# Patient Record
Sex: MALE | Race: WHITE | NOT HISPANIC OR LATINO | Employment: PART TIME | URBAN - METROPOLITAN AREA
[De-identification: names, ages, dates, MRNs, and addresses within clinical notes are randomized per-mention and may not be internally consistent; named-entity substitution may affect disease eponyms.]

---

## 2017-03-10 ENCOUNTER — TELEPHONE (OUTPATIENT)
Dept: GASTROENTEROLOGY | Facility: CLINIC | Age: 67
End: 2017-03-10

## 2017-03-10 NOTE — TELEPHONE ENCOUNTER
----- Message from Will Garcia sent at 3/10/2017  9:32 AM CST -----  Contact: PT [phone]  States he is calling to reschd his appt to another Friday around 10 am and can be reached at [phone]//[email or web address]

## 2017-04-06 ENCOUNTER — TELEPHONE (OUTPATIENT)
Dept: GASTROENTEROLOGY | Facility: CLINIC | Age: 67
End: 2017-04-06

## 2017-04-07 ENCOUNTER — HOSPITAL ENCOUNTER (OUTPATIENT)
Dept: RADIOLOGY | Facility: HOSPITAL | Age: 67
Discharge: HOME | End: 2017-04-07
Attending: INTERNAL MEDICINE
Payer: MEDICARE

## 2017-04-07 ENCOUNTER — OFFICE VISIT (OUTPATIENT)
Dept: GASTROENTEROLOGY | Facility: CLINIC | Age: 67
End: 2017-04-07
Payer: MEDICARE

## 2017-04-07 VITALS
HEART RATE: 80 BPM | SYSTOLIC BLOOD PRESSURE: 154 MMHG | HEIGHT: 74 IN | DIASTOLIC BLOOD PRESSURE: 76 MMHG | BODY MASS INDEX: 28.29 KG/M2 | WEIGHT: 220.44 LBS

## 2017-04-07 DIAGNOSIS — K74.60 LIVER CIRRHOSIS SECONDARY TO NASH: Primary | ICD-10-CM

## 2017-04-07 DIAGNOSIS — K75.81 LIVER CIRRHOSIS SECONDARY TO NASH: Primary | ICD-10-CM

## 2017-04-07 DIAGNOSIS — I85.10 SECONDARY ESOPHAGEAL VARICES WITHOUT BLEEDING: ICD-10-CM

## 2017-04-07 DIAGNOSIS — K74.60 LIVER CIRRHOSIS SECONDARY TO NASH: ICD-10-CM

## 2017-04-07 DIAGNOSIS — K75.81 LIVER CIRRHOSIS SECONDARY TO NASH: ICD-10-CM

## 2017-04-07 PROCEDURE — 99999 PR PBB SHADOW E&M-EST. PATIENT-LVL III: CPT | Mod: PBBFAC,,, | Performed by: INTERNAL MEDICINE

## 2017-04-07 PROCEDURE — 99204 OFFICE O/P NEW MOD 45 MIN: CPT | Mod: S$PBB,,, | Performed by: INTERNAL MEDICINE

## 2017-04-07 PROCEDURE — 76705 ECHO EXAM OF ABDOMEN: CPT | Mod: 26,,, | Performed by: RADIOLOGY

## 2017-04-07 PROCEDURE — 76705 ECHO EXAM OF ABDOMEN: CPT | Mod: TC,PO

## 2017-04-07 RX ORDER — MULTIVITAMIN
1 TABLET ORAL DAILY
COMMUNITY

## 2017-04-07 RX ORDER — AMOXICILLIN 500 MG
2 CAPSULE ORAL DAILY
COMMUNITY

## 2017-04-07 RX ORDER — LOSARTAN POTASSIUM AND HYDROCHLOROTHIAZIDE 12.5; 1 MG/1; MG/1
1 TABLET ORAL DAILY
COMMUNITY
Start: 2017-01-21

## 2017-04-07 NOTE — MR AVS SNAPSHOT
[facility]  [address] [facility] [address] [place] [address]  Phone: [phone]  Fax: [phone]                  Phi Segovia Jr.   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    -  Primary     Secondary esophageal varices without bleeding                To Do List           Future Appointments        Provider Department Dept Phone    2017 11:00 AM LAB, SAME DAY [facility] [phone]    2017 1:00 PM [facility] [phone]    10/6/2017 8:25 AM LABORATORY, [facility] [phone]    10/6/2017 8:30 AM [facility] [phone]    10/6/2017 11:30 AM Analy Fair MD [facility] [phone]      Goals (5 Years of Data)     None      Follow-Up and Disposition     Return in about 6 months (around 10/7/2017).      [facility]     [facility] Care [facility] -  Assistance  Unless otherwise directed by your provider, please contact [facility], our nurse care line that is available for  assistance.     Registered nurses in the [facility] provide: appointment scheduling, clinical advisement, health education, and other advisory services.  Call: [phone] (toll free)               Medications           Message regarding Medications     Verify the changes and/or additions to your medication regime listed below are the same as discussed with your clinician today.  If any of these changes or additions are incorrect, please notify your healthcare provider.             Verify that the below list of medications is an accurate representation of the medications you are currently taking.  If none reported, the list may be blank. If  "incorrect, please contact your healthcare provider. Carry this list with you in case of emergency.           Current Medications     fish oil-omega-3 fatty acids 300-1,000 mg capsule Take 2 g by mouth once daily.    losartan-hydrochlorothiazide 100-12.5 mg (HYZAAR) 100-12.5 mg Tab Take 1 tablet by mouth once daily.     multivitamin (ONE DAILY MULTIVITAMIN) per tablet Take 1 tablet by mouth once daily.           Clinical Reference Information           Your Vitals Were     BP Pulse Height Weight BMI    154/76 80 6' 2" (1.88 m) 100 kg (220 lb 7.4 oz) 28.31 kg/m2      Blood Pressure          Most Recent Value    BP  (!)  154/76      Allergies as of 4/7/2017     Sulfa (Sulfonamide Antibiotics)      Immunizations Administered on Date of Encounter - 4/7/2017     None      Orders Placed During Today's Visit     Future Labs/Procedures Expected by Expires    Hepatitis A antibody, IgG  4/7/2017 6/6/2018    Hepatitis B surface antibody  4/7/2017 6/6/2018    Recurring Lab Work Interval Expires    AFP tumor marker   4/7/2018    CBC auto differential   4/7/2018    Comprehensive metabolic panel   4/7/2018    Protime-INR   4/7/2018    US Abdomen Limited   4/7/2018      Language Assistance Services     ATTENTION: Language assistance services are available, free of charge. Please call [phone].      ATENCIÓN: Si trevala brenna, tiene a mchugh disposición servicios gratuitos de asistencia lingüística. Llame al [phone].     [facility] Ý: N?u b?n nói Ti?ng Vi?t, có các d?ch v? h? tr? ngôn ng? mi?n phí dành cho b?n. G?i s? [phone].         [facility] complies with applicable Federal civil rights laws and does not discriminate on the basis of race, color, national origin, age, disability, or sex.        "

## 2017-04-07 NOTE — PROGRESS NOTES
Subjective:     Phi Segovia Jr. is here for evaluation of Fatty Liver (referred by Dr. Olivo, accompanied by wife, Comfort)      HPI  Phi Segovia Jr. is here for evaluation of possible Gleason cirrhosis.  His outside records were reviewed.  His viral hepatitis studies were negative.  Normal alpha-1 antitrypsin phenotype, normal ceruloplasmin, normal LIDA, slightly elevated anti-smooth muscle antibody.  CMP shows mild elevations of AST and ALT, bilirubin and INR are normal.  CBC shows thrombocytopenia.  US from 2015 showed evidence of splenomegaly.    Past ultrasound was from October 2016 showed fatty infiltration of the liver with hepatomegaly.    The patient reports feeling well.  He denies previous diagnosis of liver disease and no family history of liver disease.  He denies any significant alcohol history.    No evidence of liver decompensation: no ascites, confusion or GI bleeding.      Review of Systems   Constitutional: Negative.    HENT: Negative.    Eyes: Negative.    Respiratory: Negative.    Cardiovascular: Negative.    Gastrointestinal: Negative.    Genitourinary: Negative.    Musculoskeletal: Negative.    Skin: Negative.    Neurological: Negative.    Psychiatric/Behavioral: Negative.        Objective:     Physical Exam   Constitutional: He is oriented to person, place, and time. He appears well-developed and well-nourished. No distress.   HENT:   Head: Normocephalic and atraumatic.   Mouth/Throat: Oropharynx is clear and moist. No oropharyngeal exudate.   Eyes: Conjunctivae are normal. Pupils are equal, round, and reactive to light. Right eye exhibits no discharge. Left eye exhibits no discharge. No scleral icterus.   Pulmonary/Chest: Effort normal and breath sounds normal. No respiratory distress. He has no wheezes.   Abdominal: Soft. He exhibits no distension. There is no tenderness.   Musculoskeletal: He exhibits no edema.   Neurological: He is alert and oriented to person, place, and  time.   Psychiatric: He has a normal mood and affect. His behavior is normal.   Vitals reviewed.      Computed MELD-Na score unavailable. Necessary lab results were not found.  Computed MELD score unavailable. Necessary lab results were not found.    No results found for: WBC, HGB, HCT, PLT  No results found for: BUN, CREATININE, GLU, CALCIUM, NA, K, CL, MG  No results found for: AST, ALT, ALKPHOS, BILITOT, ALBUMIN  No results found for: INR      Assessment/Plan:     1. Liver cirrhosis secondary to GLEASON    2. Secondary esophageal varices without bleeding      Phi Segovia Jr. is a 66 y.o. male withFatty Liver (referred by Dr. 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  -Extensive discussion with patient and wife about diagnosis of cirrhosis, current compensation and potential risk for decompensation as well as risk of liver cancer  -Will check meld labs today  -Will try to get ultrasound for HCC surveillance  -Discussed the importance of weight loss, ideal weight closer to 200 pounds  -Discussed importance of lipid and glycemic control  -     Hepatitis A antibody, IgG; Future; Expected date: 4/7/17  -     Comprehensive metabolic panel; Standing  -     CBC auto differential; Standing  -     AFP tumor marker; Standing  -     Protime-INR; Standing  -     US Abdomen Limited; Standing  -     Hepatitis B surface antibody; Future; Expected date: 4/7/17    Secondary esophageal varices without bleeding-no previous bleeding  -Continue with variceal surveillance as planned with repeat upper endoscopy in 2 years    Return to clinic in 6 months with pre-clinic labs and imaging      Analy Fair MD

## 2017-04-12 DIAGNOSIS — Z23 NEED FOR HEPATITIS A AND B VACCINATION: Primary | ICD-10-CM

## 2017-04-13 ENCOUNTER — TELEPHONE (OUTPATIENT)
Dept: GASTROENTEROLOGY | Facility: CLINIC | Age: 67
End: 2017-04-13

## 2017-04-13 NOTE — TELEPHONE ENCOUNTER
----- Message from Lisa Trinidad sent at 4/13/2017  1:46 PM CDT -----  Would like to speak to nurse. Please call back at [phone]. thanks

## 2017-04-13 NOTE — TELEPHONE ENCOUNTER
Spoke with patient. He states receiving  1st twinrix vaccination at [facility] and they will fax the confirmation. Will inform Dr. Fair, he verbalized understanding.

## 2017-04-17 ENCOUNTER — TELEPHONE (OUTPATIENT)
Dept: GASTROENTEROLOGY | Facility: CLINIC | Age: 67
End: 2017-04-17

## 2017-04-17 NOTE — TELEPHONE ENCOUNTER
Spoke with Jose David. He states receiving the consult note but no lab results. Will fax labs now.

## 2017-04-17 NOTE — TELEPHONE ENCOUNTER
----- Message from Kala Flowers sent at 4/17/2017 12:05 PM CDT -----  Contact: Jose David Segovia Office  Jose David Segovia Office would like call....[phone]

## 2017-06-28 ENCOUNTER — TELEPHONE (OUTPATIENT)
Dept: GASTROENTEROLOGY | Facility: CLINIC | Age: 67
End: 2017-06-28

## 2017-06-28 NOTE — TELEPHONE ENCOUNTER
----- Message from Ruth Hills sent at 6/28/2017  3:16 PM CDT -----  Contact: self [phone]  States that he is calling to get information on hep A & B combo. Please call back at [phone]//thank you acc

## 2017-06-30 ENCOUNTER — TELEPHONE (OUTPATIENT)
Dept: GASTROENTEROLOGY | Facility: CLINIC | Age: 67
End: 2017-06-30

## 2017-06-30 NOTE — TELEPHONE ENCOUNTER
----- Message from Haley Larios sent at 6/30/2017  1:32 PM CDT -----  Contact: pt  States he needs to speak to Trisha regarding the Hep A & B shot. Please call pt at [phone]. Thank you

## 2017-06-30 NOTE — TELEPHONE ENCOUNTER
Spoke with patient. Informed the need for the Twinrix vaccination, he verbalized understanding and will get externally, MARQUITA.

## 2017-09-01 ENCOUNTER — TELEPHONE (OUTPATIENT)
Dept: GASTROENTEROLOGY | Facility: CLINIC | Age: 67
End: 2017-09-01

## 2017-09-01 NOTE — TELEPHONE ENCOUNTER
----- Message from Karin Lnio sent at 9/1/2017  9:14 AM CDT -----  Contact: pt  Pt states [facility], if any question call pt @ [phone].

## 2017-10-05 ENCOUNTER — TELEPHONE (OUTPATIENT)
Dept: RADIOLOGY | Facility: HOSPITAL | Age: 67
End: 2017-10-05

## 2017-10-06 ENCOUNTER — HOSPITAL ENCOUNTER (OUTPATIENT)
Dept: RADIOLOGY | Facility: HOSPITAL | Age: 67
Discharge: HOME | End: 2017-10-06
Attending: INTERNAL MEDICINE
Payer: MEDICARE

## 2017-10-06 ENCOUNTER — OFFICE VISIT (OUTPATIENT)
Dept: GASTROENTEROLOGY | Facility: CLINIC | Age: 67
End: 2017-10-06
Payer: MEDICARE

## 2017-10-06 VITALS
SYSTOLIC BLOOD PRESSURE: 138 MMHG | DIASTOLIC BLOOD PRESSURE: 70 MMHG | HEART RATE: 80 BPM | WEIGHT: 224.88 LBS | BODY MASS INDEX: 28.86 KG/M2 | HEIGHT: 74 IN

## 2017-10-06 DIAGNOSIS — K75.81 LIVER CIRRHOSIS SECONDARY TO NASH: ICD-10-CM

## 2017-10-06 DIAGNOSIS — K74.60 LIVER CIRRHOSIS SECONDARY TO NASH: ICD-10-CM

## 2017-10-06 DIAGNOSIS — K74.69 OTHER CIRRHOSIS OF LIVER: Primary | ICD-10-CM

## 2017-10-06 PROCEDURE — 76705 ECHO EXAM OF ABDOMEN: CPT | Mod: 26,,, | Performed by: RADIOLOGY

## 2017-10-06 PROCEDURE — 76705 ECHO EXAM OF ABDOMEN: CPT | Mod: TC,PO

## 2017-10-06 PROCEDURE — 99213 OFFICE O/P EST LOW 20 MIN: CPT | Mod: S$PBB,,, | Performed by: INTERNAL MEDICINE

## 2017-10-06 PROCEDURE — 99999 PR PBB SHADOW E&M-EST. PATIENT-LVL III: CPT | Mod: PBBFAC,,, | Performed by: INTERNAL MEDICINE

## 2017-10-06 PROCEDURE — 99213 OFFICE O/P EST LOW 20 MIN: CPT | Mod: PBBFAC,25,PO | Performed by: INTERNAL MEDICINE

## 2017-10-06 NOTE — PROGRESS NOTES
Subjective:     Phi Segovia Jr. is here for follow up of Cirrhosis (took 2nd dose of twinrix 08/29/2017, taking last injection next week with [facility])      HPI  Since Phi Segovia Jr.'s last visit he denies any acute issues.  Overall he has been feeling well.    No evidence of liver decompensation: no ascites, confusion or GI bleeding.      Review of Systems    Objective:     Physical Exam   Constitutional: He is oriented to person, place, and time. He appears well-developed and well-nourished. No distress.   HENT:   Head: Normocephalic and atraumatic.   Mouth/Throat: Oropharynx is clear and moist. No oropharyngeal exudate.   Eyes: Conjunctivae are normal. Pupils are equal, round, and reactive to light. Right eye exhibits no discharge. Left eye exhibits no discharge. No scleral icterus.   Pulmonary/Chest: Effort normal and breath sounds normal. No respiratory distress. He has no wheezes.   Abdominal: Soft. He exhibits no distension. There is no tenderness.   Musculoskeletal: He exhibits no edema.   Neurological: He is alert and oriented to person, place, and time.   Psychiatric: He has a normal mood and affect. His behavior is normal.   Vitals reviewed.      US 10/2017  1.  Hepatomegaly with what appears to chronic-appearing liver disease.  No focal liver lesions.    2.  Nonobstructing lower pole right renal calculus    MELD-Na score: 7 at 10/6/2017  8:06 AM  MELD score: 7 at 10/6/2017  8:06 AM  Calculated from:  Serum Creatinine: 0.9 mg/dL (Rounded to 1) at 10/6/2017  8:06 AM  Serum Sodium: 141 mmol/L (Rounded to 137) at 10/6/2017  8:06 AM  Total Bilirubin: 0.7 mg/dL (Rounded to 1) at 10/6/2017  8:06 AM  INR(ratio): 1.1 at 10/6/2017  8:06 AM  Age: 67 years    WBC   Date Value Ref Range Status   10/06/2017 5.91 3.90 - 12.70 K/uL Final     Hemoglobin   Date Value Ref Range Status   10/06/2017 16.4 14.0 - 18.0 g/dL Final     Hematocrit   Date Value Ref Range Status   10/06/2017 45.1 40.0 - 54.0 % Final      Platelets   Date Value Ref Range Status   10/06/2017 81 (L) 150 - 350 K/uL Final     BUN, Bld   Date Value Ref Range Status   10/06/2017 15 8 - 23 mg/dL Final     Creatinine   Date Value Ref Range Status   10/06/2017 0.9 0.5 - 1.4 mg/dL Final     Glucose   Date Value Ref Range Status   10/06/2017 128 (H) 70 - 110 mg/dL Final     Calcium   Date Value Ref Range Status   10/06/2017 9.9 8.7 - 10.5 mg/dL Final     Sodium   Date Value Ref Range Status   10/06/2017 141 136 - 145 mmol/L Final     Potassium   Date Value Ref Range Status   10/06/2017 3.9 3.5 - 5.1 mmol/L Final     Chloride   Date Value Ref Range Status   10/06/2017 106 95 - 110 mmol/L Final     AST   Date Value Ref Range Status   10/06/2017 59 (H) 10 - 40 U/L Final     ALT   Date Value Ref Range Status   10/06/2017 73 (H) 10 - 44 U/L Final     Alkaline Phosphatase   Date Value Ref Range Status   10/06/2017 110 55 - 135 U/L Final     Total Bilirubin   Date Value Ref Range Status   10/06/2017 0.7 0.1 - 1.0 mg/dL Final     Comment:     For infants and newborns, interpretation of results should be based  on gestational age, weight and in agreement with clinical  observations.  Premature Infant recommended reference ranges:  Up to 24 hours.............<8.0 mg/dL  Up to 48 hours............<12.0 mg/dL  3-5 days..................<15.0 mg/dL  6-29 days.................<15.0 mg/dL       Albumin   Date Value Ref Range Status   10/06/2017 4.1 3.5 - 5.2 g/dL Final     INR   Date Value Ref Range Status   10/06/2017 1.1 0.8 - 1.2 Final     Comment:     Coumadin Therapy:  2.0 - 3.0 for INR for all indicators except mechanical heart valves  and antiphospholipid syndromes which should use 2.5 - 3.5.           Assessment/Plan:     1. Other cirrhosis of liver      Phi Segovia Jr. is a 67 y.o. male withCirrhosis (took 2nd dose of twinrix 08/29/2017, taking last injection next week with [facility])      Other cirrhosis of liver-low meld, well compensated  -Continue with  meld labs every 6 months  -HCC surveillance every 6 months  -Continue to maintain strict glycemic, lipid and weight control.  -     Comprehensive metabolic panel; Future; Expected date: 10/06/2017  -     CBC auto differential; Future; Expected date: 10/06/2017  -     AFP tumor marker; Future; Expected date: 10/06/2017  -     Protime-INR; Future; Expected date: 10/06/2017  -     US Abdomen Limited; Future; Expected date: 10/06/2017  -     US Abdomen Limited; Future; Expected date: 10/06/2017  -     Comprehensive metabolic panel; Future; Expected date: 10/06/2017  -     CBC auto differential; Future; Expected date: 10/06/2017  -     AFP tumor marker; Future; Expected date: 10/06/2017  -     Protime-INR; Future; Expected date: 10/06/2017  -Needs to complete Hep A and B vaccines  -Repeat EGD in 2019    RTC in 6 months or pre-clinic labs and imaging    Analy Fair MD

## 2017-10-13 ENCOUNTER — TELEPHONE (OUTPATIENT)
Dept: GASTROENTEROLOGY | Facility: CLINIC | Age: 67
End: 2017-10-13

## 2017-10-13 NOTE — TELEPHONE ENCOUNTER
Spoke with patient. He states he wanted to try Gas X or simethicone for his bloating. He took his last Twinrix vaccination today along with Flu and pneumonia, will inform Dr. Fair, he verbalized understadning, MARQUITA.

## 2017-10-13 NOTE — TELEPHONE ENCOUNTER
----- Message from Kathy Zelaya sent at 10/13/2017  2:49 PM CDT -----  Contact: pt   Call pt regarding being bloated      ...[phone]

## 2018-03-15 ENCOUNTER — TELEPHONE (OUTPATIENT)
Dept: GASTROENTEROLOGY | Facility: CLINIC | Age: 68
End: 2018-03-15

## 2018-03-15 NOTE — TELEPHONE ENCOUNTER
----- Message from Liz Gore sent at 3/14/2018  1:59 PM CDT -----  Contact: self   Patient would like to know if blood work on 4/13/18. Please call back at [phone].      Thanks,  Liz Gore

## 2018-03-22 ENCOUNTER — TELEPHONE (OUTPATIENT)
Dept: GASTROENTEROLOGY | Facility: CLINIC | Age: 68
End: 2018-03-22

## 2018-03-22 NOTE — TELEPHONE ENCOUNTER
----- Message from Kathy Zelaya sent at 3/22/2018  9:50 AM CDT -----  Contact: pt  Call pt regarding his appt that need to be reschedule. Pt also states that he need to talk to the nurse to get a CBC lab.   [phone]

## 2018-04-12 ENCOUNTER — TELEPHONE (OUTPATIENT)
Dept: RADIOLOGY | Facility: HOSPITAL | Age: 68
End: 2018-04-12

## 2018-04-12 ENCOUNTER — TELEPHONE (OUTPATIENT)
Dept: GASTROENTEROLOGY | Facility: CLINIC | Age: 68
End: 2018-04-12

## 2018-04-12 ENCOUNTER — PATIENT MESSAGE (OUTPATIENT)
Dept: GASTROENTEROLOGY | Facility: CLINIC | Age: 68
End: 2018-04-12

## 2018-04-12 DIAGNOSIS — Z12.5 SPECIAL SCREENING FOR MALIGNANT NEOPLASM OF PROSTATE: ICD-10-CM

## 2018-04-12 DIAGNOSIS — E11.9 DIABETES MELLITUS WITHOUT COMPLICATION: ICD-10-CM

## 2018-04-12 DIAGNOSIS — E78.1 PURE HYPERGLYCERIDEMIA: Primary | ICD-10-CM

## 2018-04-12 NOTE — PROGRESS NOTES
Pt to see me tomorrow for Cirrhosis f/u.   Per request of PCP will add labs for tomorrow in addition to his regular cirrhosis management.

## 2018-04-12 NOTE — PROGRESS NOTES
Labs ordered for Dr. Zander Segovia (ph# [phone], fax# [phone]), [facility].

## 2018-04-12 NOTE — TELEPHONE ENCOUNTER
Left message on answering machine to return a call to Dr. Fair's office at [facility],reschedule appointment, [facility].

## 2018-04-13 ENCOUNTER — HOSPITAL ENCOUNTER (OUTPATIENT)
Dept: RADIOLOGY | Facility: HOSPITAL | Age: 68
Discharge: HOME | End: 2018-04-13
Attending: INTERNAL MEDICINE
Payer: MEDICARE

## 2018-04-13 ENCOUNTER — OFFICE VISIT (OUTPATIENT)
Dept: GASTROENTEROLOGY | Facility: CLINIC | Age: 68
End: 2018-04-13
Payer: MEDICARE

## 2018-04-13 VITALS
HEART RATE: 60 BPM | WEIGHT: 220.44 LBS | DIASTOLIC BLOOD PRESSURE: 70 MMHG | SYSTOLIC BLOOD PRESSURE: 128 MMHG | HEIGHT: 74 IN | BODY MASS INDEX: 28.29 KG/M2

## 2018-04-13 DIAGNOSIS — K75.81 LIVER CIRRHOSIS SECONDARY TO NASH: Primary | ICD-10-CM

## 2018-04-13 DIAGNOSIS — K74.60 LIVER CIRRHOSIS SECONDARY TO NASH: Primary | ICD-10-CM

## 2018-04-13 DIAGNOSIS — I85.10 SECONDARY ESOPHAGEAL VARICES WITHOUT BLEEDING: ICD-10-CM

## 2018-04-13 DIAGNOSIS — K74.69 OTHER CIRRHOSIS OF LIVER: ICD-10-CM

## 2018-04-13 PROCEDURE — 99999 PR PBB SHADOW E&M-EST. PATIENT-LVL III: CPT | Mod: PBBFAC,,, | Performed by: NURSE PRACTITIONER

## 2018-04-13 PROCEDURE — 76705 ECHO EXAM OF ABDOMEN: CPT | Mod: TC,PO

## 2018-04-13 PROCEDURE — 76705 ECHO EXAM OF ABDOMEN: CPT | Mod: 26,,, | Performed by: RADIOLOGY

## 2018-04-13 PROCEDURE — 99214 OFFICE O/P EST MOD 30 MIN: CPT | Mod: S$PBB,,, | Performed by: NURSE PRACTITIONER

## 2018-04-13 PROCEDURE — 99213 OFFICE O/P EST LOW 20 MIN: CPT | Mod: PBBFAC,25,PO | Performed by: NURSE PRACTITIONER

## 2018-04-13 RX ORDER — METFORMIN HYDROCHLORIDE 500 MG/1
500 TABLET ORAL 2 TIMES DAILY
COMMUNITY

## 2018-04-13 RX ORDER — DEXTROSE 4 G
TABLET,CHEWABLE ORAL
Refills: 0 | COMMUNITY
Start: 2018-02-16

## 2018-04-13 NOTE — PROGRESS NOTES
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 A diagnosis of Secondary esophageal varices without bleeding was also pertinent to this visit.    PCP: Zander Segovia       HPI:  This is a 67 y.o. male here for follow up of the above  Pt states that since his last visit, he has been feeling overall well without any complaints.   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   He reports that his wife  in February after battling cancer.    Recent labs and imaging are stable.     Review of Systems   Constitutional: Negative for chills, fever, malaise/fatigue and weight loss.   Respiratory: Negative for cough.    Cardiovascular: Negative for chest pain.   Gastrointestinal:        Per HPI   Musculoskeletal: Negative for myalgias.   Skin: Negative for itching and rash.   Neurological: Negative for headaches.   Psychiatric/Behavioral: The patient is not nervous/anxious.        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 No pertinent surgical history.    Family History:   History reviewed. No pertinent family history.    Social History:   Social History   Substance Use Topics    Smoking status: Never Smoker    Smokeless tobacco: Never Used    Alcohol use Yes      Comment: socially       Allergies: Reviewed    Home Medications:  Current Outpatient Prescriptions on File Prior to Visit   Medication Sig Dispense Refill    fish oil-omega-3 fatty acids 300-1,000 mg capsule Take 2 g by mouth once daily.      losartan-hydrochlorothiazide 100-12.5 mg (HYZAAR) 100-12.5 mg Tab Take 1 tablet by mouth once daily.       multivitamin (ONE DAILY MULTIVITAMIN) per tablet Take 1 tablet by mouth once daily.       No current facility-administered medications on  "file prior to visit.        Physical Exam:  Vital Signs:  /70   Pulse 60   Ht 6' 2" (1.88 m)   Wt 100 kg (220 lb 7.4 oz)   BMI 28.31 kg/m²   Body mass index is 28.31 kg/m².  Physical Exam   Constitutional: He is oriented to person, place, and time. He appears well-developed.   HENT:   Head: Normocephalic.   Eyes: No scleral icterus.   Neck: Normal range of motion.   Cardiovascular: Normal rate and regular rhythm.    Pulmonary/Chest: Effort normal and breath sounds normal.   Abdominal: Soft. Bowel sounds are normal. He exhibits no distension. There is no tenderness.   Musculoskeletal: Normal range of motion.   Neurological: He is alert and oriented to person, place, and time.   Skin: Skin is warm and dry.   Psychiatric: He has a normal mood and affect.   Vitals reviewed.      Labs: Pertinent labs reviewed.  MELD-Na score: 10 at 4/13/2018  7:41 AM  MELD score: 10 at 4/13/2018  7:41 AM  Calculated from:  Serum Creatinine: 0.9 mg/dL (Rounded to 1) at 4/13/2018  7:41 AM  Serum Sodium: 141 mmol/L (Rounded to 137) at 4/13/2018  7:41 AM  Total Bilirubin: 1.6 mg/dL at 4/13/2018  7:41 AM  INR(ratio): 1.2 at 4/13/2018  7:41 AM  Age: 67 years    Assessment:  1. 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  Will have him repeat in 4 weeks for trending.   - Otherwise, will continue MELD and HCC surveillance every 6 months.   Repeat EGD in 2019    -     CBC auto differential; Future; Expected date: 04/13/2018  -     Comprehensive metabolic panel; Future; Expected date: 04/13/2018  -     Protime-INR; Future; Expected date: 04/13/2018  -     AFP tumor marker; Future; Expected date: 04/13/2018  -     US Abdomen Limited; Future; Expected date: 04/13/2018  -     Hepatic function panel; Future; Expected date: 05/13/2018    Secondary esophageal varices " without bleeding          Return to Clinic:    Follow-up in about 6 months (around 10/13/2018).

## 2018-05-09 ENCOUNTER — TELEPHONE (OUTPATIENT)
Dept: GASTROENTEROLOGY | Facility: CLINIC | Age: 68
End: 2018-05-09

## 2018-05-09 DIAGNOSIS — Z23 NEED FOR PROPHYLACTIC VACCINATION AGAINST HEPATITIS A AND HEPATITIS B: Primary | ICD-10-CM

## 2018-05-09 NOTE — TELEPHONE ENCOUNTER
----- Message from Mirta Leach LPN sent at 4/19/2018  7:48 AM CDT -----  Regarding: Twinrix orders  Please enter 2 orders for Twinrix.       ----- Message -----  From: Mirta Leach LPN  Sent: 10/5/2017  11:35 AM  To: Mirta Leach LPN  Subject: Twinrix                                          Start Twinrix series.

## 2018-05-18 ENCOUNTER — PATIENT MESSAGE (OUTPATIENT)
Dept: GASTROENTEROLOGY | Facility: CLINIC | Age: 68
End: 2018-05-18

## 2018-09-27 ENCOUNTER — HISTORICAL (OUTPATIENT)
Dept: LAB | Facility: HOSPITAL | Age: 68
End: 2018-09-27

## 2018-09-27 LAB
ABS NEUT (OLG): 2.35
ALBUMIN SERPL-MCNC: 4 GM/DL (ref 3.4–5)
ALBUMIN/GLOB SERPL: 1.1 RATIO (ref 1.1–2)
ALP SERPL-CCNC: 99 UNIT/L (ref 46–116)
ALT SERPL-CCNC: 110 UNIT/L (ref 12–78)
APPEARANCE, UA: CLEAR
AST SERPL-CCNC: 86 UNIT/L (ref 10–37)
BACTERIA #/AREA URNS AUTO: NORMAL /HPF
BASOPHILS # BLD AUTO: 0.01 X10(3)/MCL
BASOPHILS NFR BLD AUTO: 0.2 %
BILIRUB SERPL-MCNC: 1.1 MG/DL (ref 0.2–1)
BILIRUB UR QL STRIP: NEGATIVE
BILIRUBIN DIRECT+TOT PNL SERPL-MCNC: 0.28 MG/DL (ref 0–0.2)
BILIRUBIN DIRECT+TOT PNL SERPL-MCNC: 0.82 MG/DL
BUN SERPL-MCNC: 16 MG/DL (ref 7–18)
CALCIUM SERPL-MCNC: 9.6 MG/DL (ref 8.5–10.1)
CHLORIDE SERPL-SCNC: 103 MMOL/L (ref 98–107)
CHOLEST SERPL-MCNC: 139 MG/DL (ref 50–200)
CHOLEST/HDLC SERPL: 4 {RATIO} (ref 0–5)
CO2 SERPL-SCNC: 26.3 MMOL/L (ref 21–32)
COLOR UR: YELLOW
CREAT SERPL-MCNC: 0.85 MG/DL (ref 0.7–1.3)
CREAT UR-MCNC: 124 MG/DL
EOSINOPHIL # BLD AUTO: 0.16 X10(3)/MCL
EOSINOPHIL NFR BLD AUTO: 3.4 %
ERYTHROCYTE [DISTWIDTH] IN BLOOD BY AUTOMATED COUNT: 14 %
EST. AVERAGE GLUCOSE BLD GHB EST-MCNC: 126 MG/DL
GLOBULIN SER-MCNC: 3.7 GM/DL (ref 2.4–3.5)
GLUCOSE (UA): NEGATIVE
GLUCOSE SERPL-MCNC: 121 MG/DL (ref 74–106)
HBA1C MFR BLD: 6 % (ref 4.5–6.2)
HCT VFR BLD AUTO: 42.3 % (ref 39–49)
HDLC SERPL-MCNC: 34 MG/DL (ref 35–60)
HGB BLD-MCNC: 15.2 GM/DL (ref 12.6–16.6)
HGB UR QL STRIP: NEGATIVE
IMM GRANULOCYTES # BLD AUTO: 0 10*3/UL (ref 0–0.1)
IMM GRANULOCYTES NFR BLD AUTO: 0 % (ref 0–1)
KETONES UR QL STRIP: NEGATIVE
LDLC SERPL CALC-MCNC: 72 MG/DL (ref 50–140)
LEUKOCYTE ESTERASE UR QL STRIP: NEGATIVE
LYMPHOCYTES # BLD AUTO: 1.68 X10(3)/MCL
LYMPHOCYTES NFR BLD AUTO: 35.7 %
MCH RBC QN AUTO: 31.4 PG (ref 27–33)
MCHC RBC AUTO-ENTMCNC: 35.9 GM/DL (ref 32–35)
MCV RBC AUTO: 87.4 FL (ref 84–97)
MICROALBUMIN UR-MCNC: 1.2 MG/DL
MICROALBUMIN/CREAT RATIO PNL UR: 9.7 MG/GM CR (ref 0–30)
MONOCYTES # BLD AUTO: 0.51 X10(3)/MCL
MONOCYTES NFR BLD AUTO: 10.8 %
NEUTROPHILS # BLD AUTO: 2.35 X10(3)/MCL
NEUTROPHILS NFR BLD AUTO: 49.9 %
NITRITE UR QL STRIP.AUTO: NEGATIVE
PH UR STRIP: 6 [PH] (ref 4.6–8)
PLATELET # BLD AUTO: 81 X10(3)/MCL (ref 151–368)
PLATELET # BLD EST: NORMAL 10*3/UL
PMV BLD AUTO: 10 FL
POTASSIUM SERPL-SCNC: 3.5 MMOL/L (ref 3.5–5.1)
PROT SERPL-MCNC: 7.7 GM/DL (ref 6.4–8.2)
PROT UR QL STRIP: NEGATIVE
PSA SERPL-MCNC: 1.86 NG/ML (ref 0–4)
RBC # BLD AUTO: 4.84 X10(6)/MCL (ref 4.3–5.6)
RBC #/AREA URNS HPF: NORMAL /HPF
SODIUM SERPL-SCNC: 140 MMOL/L (ref 136–145)
SP GR UR STRIP: 1.02 (ref 1–1.03)
SQUAMOUS EPITHELIAL, UA: NORMAL
TRIGL SERPL-MCNC: 167 MG/DL (ref 30–150)
UROBILINOGEN UR STRIP-ACNC: 0.2
VLDLC SERPL CALC-MCNC: 33 MG/DL
WBC # SPEC AUTO: 4.71 X10(3)/MCL (ref 3.4–9.2)
WBC #/AREA URNS AUTO: NORMAL /[HPF]

## 2018-10-11 ENCOUNTER — TELEPHONE (OUTPATIENT)
Dept: RADIOLOGY | Facility: HOSPITAL | Age: 68
End: 2018-10-11

## 2018-10-12 ENCOUNTER — CLINICAL SUPPORT (OUTPATIENT)
Dept: GASTROENTEROLOGY | Facility: CLINIC | Age: 68
End: 2018-10-12
Payer: MEDICARE

## 2018-10-12 ENCOUNTER — OFFICE VISIT (OUTPATIENT)
Dept: GASTROENTEROLOGY | Facility: CLINIC | Age: 68
End: 2018-10-12
Payer: MEDICARE

## 2018-10-12 ENCOUNTER — HOSPITAL ENCOUNTER (OUTPATIENT)
Dept: RADIOLOGY | Facility: HOSPITAL | Age: 68
Discharge: HOME | End: 2018-10-12
Attending: NURSE PRACTITIONER
Payer: MEDICARE

## 2018-10-12 VITALS
WEIGHT: 228.19 LBS | HEART RATE: 72 BPM | BODY MASS INDEX: 29.29 KG/M2 | HEIGHT: 74 IN | DIASTOLIC BLOOD PRESSURE: 86 MMHG | SYSTOLIC BLOOD PRESSURE: 160 MMHG

## 2018-10-12 DIAGNOSIS — K75.81 LIVER CIRRHOSIS SECONDARY TO NASH: Primary | ICD-10-CM

## 2018-10-12 DIAGNOSIS — K74.60 LIVER CIRRHOSIS SECONDARY TO NASH: Primary | ICD-10-CM

## 2018-10-12 DIAGNOSIS — Z23 NEED FOR PROPHYLACTIC VACCINATION AGAINST HEPATITIS A AND HEPATITIS B: ICD-10-CM

## 2018-10-12 DIAGNOSIS — K74.60 LIVER CIRRHOSIS SECONDARY TO NASH: ICD-10-CM

## 2018-10-12 DIAGNOSIS — K75.81 LIVER CIRRHOSIS SECONDARY TO NASH: ICD-10-CM

## 2018-10-12 DIAGNOSIS — I85.10 SECONDARY ESOPHAGEAL VARICES WITHOUT BLEEDING: ICD-10-CM

## 2018-10-12 PROCEDURE — 99999 PR PBB SHADOW E&M-EST. PATIENT-LVL III: CPT | Mod: PBBFAC,,, | Performed by: NURSE PRACTITIONER

## 2018-10-12 PROCEDURE — 99214 OFFICE O/P EST MOD 30 MIN: CPT | Mod: S$PBB,,, | Performed by: NURSE PRACTITIONER

## 2018-10-12 PROCEDURE — 76705 ECHO EXAM OF ABDOMEN: CPT | Mod: 26,,, | Performed by: RADIOLOGY

## 2018-10-12 PROCEDURE — 76705 ECHO EXAM OF ABDOMEN: CPT | Mod: TC,PO

## 2018-10-12 PROCEDURE — 99213 OFFICE O/P EST LOW 20 MIN: CPT | Mod: PBBFAC,25,PO | Performed by: NURSE PRACTITIONER

## 2018-10-12 RX ORDER — EZETIMIBE 10 MG/1
TABLET ORAL
Status: ON HOLD | COMMUNITY
Start: 2018-08-28 | End: 2019-01-21 | Stop reason: HOSPADM

## 2018-10-12 NOTE — PROGRESS NOTES
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 A diagnosis of Secondary esophageal varices without bleeding was also pertinent to this visit.    PCP: Zander Segovia       HPI:  This is a 68 y.o. male here for follow up of the above  Pt states that he has overall been feeling well without any complaints  Denies any abdominal pain.  No nausea or vomiting.  No change in bowel pattern.  No melena or hematochezia. No weight loss.  No upper GI bleeding.  No ascites or BLE.  No overt confusion.  Recent HCC imaging is stable without lesions.  Waiting for updated labs.    Review of Systems   Constitutional: Negative for chills, fever, malaise/fatigue and weight loss.   Respiratory: Negative for cough.    Cardiovascular: Negative for chest pain.   Gastrointestinal:        Per HPI   Musculoskeletal: Negative for myalgias.   Skin: Negative for itching and rash.   Neurological: Negative for headaches.   Psychiatric/Behavioral: The patient is not nervous/anxious.        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    Family History:   No family history on file.    Social History:   Social History     Tobacco Use    Smoking status: Never Smoker    Smokeless tobacco: Never Used   Substance Use Topics    Alcohol use: Yes     Comment: socially    Drug use: No       Allergies: Reviewed    Home Medications:  Current Outpatient Medications on File Prior to Visit   Medication Sig Dispense Refill    blood-glucose meter Misc AS DIRECTED  0    ezetimibe (ZETIA) 10 mg tablet       fish oil-omega-3 fatty acids 300-1,000 mg capsule Take 2 g by mouth once daily.      losartan-hydrochlorothiazide 100-12.5 mg (HYZAAR) 100-12.5 mg Tab Take 1 tablet by mouth once daily.     "   metFORMIN (GLUCOPHAGE) 500 MG tablet Take 500 mg by mouth 2 (two) times daily.       multivitamin (ONE DAILY MULTIVITAMIN) per tablet Take 1 tablet by mouth once daily.       No current facility-administered medications on file prior to visit.        Physical Exam:  Vital Signs:  BP (!) 160/86   Pulse 72   Ht 6' 2" (1.88 m)   Wt 103.5 kg (228 lb 2.8 oz)   BMI 29.30 kg/m²   Body mass index is 29.3 kg/m².  Physical Exam   Constitutional: He is oriented to person, place, and time. He appears well-developed.   HENT:   Head: Normocephalic.   Eyes: No scleral icterus.   Neck: Normal range of motion.   Cardiovascular: Normal rate and regular rhythm.   Pulmonary/Chest: Effort normal and breath sounds normal.   Abdominal: Soft. Bowel sounds are normal. He exhibits no distension. There is no tenderness.   Musculoskeletal: Normal range of motion.   Neurological: He is alert and oriented to person, place, and time.   Skin: Skin is warm and dry.   Psychiatric: He has a normal mood and affect.   Vitals reviewed.      Labs: Pertinent labs reviewed.  MELD-Na score: 10 at 4/13/2018  7:41 AM  MELD score: 10 at 4/13/2018  7:41 AM  Calculated from:  Serum Creatinine: 0.9 mg/dL (Rounded to 1 mg/dL) at 4/13/2018  7:41 AM  Serum Sodium: 141 mmol/L (Rounded to 137 mmol/L) at 4/13/2018  7:41 AM  Total Bilirubin: 1.6 mg/dL at 4/13/2018  7:41 AM  INR(ratio): 1.2 at 4/13/2018  7:41 AM  Age: 67 years  EGD: 2016, EV seen.  Records in Media.  Needs repeat EGD January 2019  HCC: US 10/12/18, No lesions or masses    Assessment:  1. 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 Secondary esophageal varices without bleeding        Recommendations:  - Stable  - Plan for labs and HCC surveillance every 6 months  - EGD in January for EV surveillance.   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  -     Case request GI: EGD (ESOPHAGOGASTRODUODENOSCOPY)  -     CBC auto differential; Future; Expected date: 10/12/2018  -     Comprehensive metabolic panel; Future; " Expected date: 10/12/2018  -     Protime-INR; Future; Expected date: 10/12/2018  -     AFP tumor marker; Future; Expected date: 10/12/2018  -     US Abdomen Limited; Future; Expected date: 10/12/2018    Secondary esophageal varices without bleeding  -     Case request GI: EGD (ESOPHAGOGASTRODUODENOSCOPY)  -     CBC auto differential; Future; Expected date: 10/12/2018  -     Comprehensive metabolic panel; Future; Expected date: 10/12/2018  -     Protime-INR; Future; Expected date: 10/12/2018  -     AFP tumor marker; Future; Expected date: 10/12/2018  -     US Abdomen Limited; Future; Expected date: 10/12/2018          Return to Clinic:    Follow-up in about 6 months (around 4/12/2019).

## 2018-12-20 ENCOUNTER — PATIENT MESSAGE (OUTPATIENT)
Dept: GASTROENTEROLOGY | Facility: CLINIC | Age: 68
End: 2018-12-20

## 2018-12-28 ENCOUNTER — HISTORICAL (OUTPATIENT)
Dept: ADMINISTRATIVE | Facility: HOSPITAL | Age: 68
End: 2018-12-28

## 2019-01-16 ENCOUNTER — TELEPHONE (OUTPATIENT)
Dept: GASTROENTEROLOGY | Facility: CLINIC | Age: 69
End: 2019-01-16

## 2019-01-16 NOTE — TELEPHONE ENCOUNTER
Addendum to last note: Pt called and asked about last MELD score and wanted to know if creatine and OTC supplements was okay to take. I let him know that [facility] does not recommend any OTC supplements because these supplements can cause elevated liver enzymes. Pt verbalized understanding. NOE.

## 2019-01-16 NOTE — TELEPHONE ENCOUNTER
Pt called and states that he is in pain and he needs fluid drawn from abdomen. I let him know that if he is in pain and needs fluids drawn immediately then he will have to go to the ER to be treated. Patient verbalized understanding. SUAD

## 2019-01-21 ENCOUNTER — ANESTHESIA (OUTPATIENT)
Dept: ENDOSCOPY | Facility: HOSPITAL | Age: 69
End: 2019-01-21
Payer: MEDICARE

## 2019-01-21 ENCOUNTER — HOSPITAL ENCOUNTER (OUTPATIENT)
Facility: HOSPITAL | Age: 69
Discharge: HOME | End: 2019-01-21
Attending: INTERNAL MEDICINE | Admitting: INTERNAL MEDICINE
Payer: MEDICARE

## 2019-01-21 ENCOUNTER — ANESTHESIA EVENT (OUTPATIENT)
Dept: ENDOSCOPY | Facility: HOSPITAL | Age: 69
End: 2019-01-21
Payer: MEDICARE

## 2019-01-21 DIAGNOSIS — I85.00 ESOPHAGEAL VARICES: ICD-10-CM

## 2019-01-21 DIAGNOSIS — I85.10 SECONDARY ESOPHAGEAL VARICES WITHOUT BLEEDING: Primary | ICD-10-CM

## 2019-01-21 LAB — POCT GLUCOSE: 115 MG/DL (ref 70–110)

## 2019-01-21 PROCEDURE — 43239 EGD BIOPSY SINGLE/MULTIPLE: CPT | Performed by: INTERNAL MEDICINE

## 2019-01-21 PROCEDURE — 27201012 HC FORCEPS, HOT/COLD, DISP: Performed by: INTERNAL MEDICINE

## 2019-01-21 PROCEDURE — 88305 TISSUE EXAM BY PATHOLOGIST: CPT | Mod: 26,,, | Performed by: PATHOLOGY

## 2019-01-21 PROCEDURE — 88342 TISSUE SPECIMEN TO PATHOLOGY - SURGERY: ICD-10-PCS | Mod: 26,,, | Performed by: PATHOLOGY

## 2019-01-21 PROCEDURE — 25000003 PHARM REV CODE 250: Performed by: NURSE ANESTHETIST, CERTIFIED REGISTERED

## 2019-01-21 PROCEDURE — 37000008 HC ANESTHESIA 1ST 15 MINUTES: Performed by: INTERNAL MEDICINE

## 2019-01-21 PROCEDURE — 43239 EGD BIOPSY SINGLE/MULTIPLE: CPT | Mod: ,,, | Performed by: INTERNAL MEDICINE

## 2019-01-21 PROCEDURE — 63600175 PHARM REV CODE 636 W HCPCS: Performed by: NURSE ANESTHETIST, CERTIFIED REGISTERED

## 2019-01-21 PROCEDURE — 25000003 PHARM REV CODE 250: Performed by: INTERNAL MEDICINE

## 2019-01-21 PROCEDURE — 43239 PR EGD, FLEX, W/BIOPSY, SGL/MULTI: ICD-10-PCS | Mod: ,,, | Performed by: INTERNAL MEDICINE

## 2019-01-21 PROCEDURE — 88305 TISSUE EXAM BY PATHOLOGIST: CPT | Performed by: PATHOLOGY

## 2019-01-21 PROCEDURE — 88342 IMHCHEM/IMCYTCHM 1ST ANTB: CPT | Performed by: PATHOLOGY

## 2019-01-21 PROCEDURE — 88305 TISSUE SPECIMEN TO PATHOLOGY - SURGERY: ICD-10-PCS | Mod: 26,,, | Performed by: PATHOLOGY

## 2019-01-21 PROCEDURE — 88342 IMHCHEM/IMCYTCHM 1ST ANTB: CPT | Mod: 26,,, | Performed by: PATHOLOGY

## 2019-01-21 PROCEDURE — 37000009 HC ANESTHESIA EA ADD 15 MINS: Performed by: INTERNAL MEDICINE

## 2019-01-21 RX ORDER — NADOLOL 20 MG/1
20 TABLET ORAL NIGHTLY
Qty: 30 TABLET | Refills: 5 | Status: SHIPPED | OUTPATIENT
Start: 2019-01-21 | End: 2019-01-30 | Stop reason: ALTCHOICE

## 2019-01-21 RX ORDER — LIDOCAINE HYDROCHLORIDE 10 MG/ML
INJECTION INFILTRATION; PERINEURAL
Status: DISCONTINUED | OUTPATIENT
Start: 2019-01-21 | End: 2019-01-21

## 2019-01-21 RX ORDER — PROPOFOL 10 MG/ML
VIAL (ML) INTRAVENOUS
Status: DISCONTINUED | OUTPATIENT
Start: 2019-01-21 | End: 2019-01-21

## 2019-01-21 RX ORDER — SODIUM CHLORIDE, SODIUM LACTATE, POTASSIUM CHLORIDE, CALCIUM CHLORIDE 600; 310; 30; 20 MG/100ML; MG/100ML; MG/100ML; MG/100ML
INJECTION, SOLUTION INTRAVENOUS CONTINUOUS
Status: DISCONTINUED | OUTPATIENT
Start: 2019-01-21 | End: 2019-01-21 | Stop reason: HOSPADM

## 2019-01-21 RX ORDER — SODIUM CHLORIDE 0.9 % (FLUSH) 0.9 %
3 SYRINGE (ML) INJECTION
Status: DISCONTINUED | OUTPATIENT
Start: 2019-01-21 | End: 2019-01-21 | Stop reason: HOSPADM

## 2019-01-21 RX ADMIN — PROPOFOL 100 MG: 10 INJECTION, EMULSION INTRAVENOUS at 01:01

## 2019-01-21 RX ADMIN — SODIUM CHLORIDE, SODIUM LACTATE, POTASSIUM CHLORIDE, AND CALCIUM CHLORIDE: .6; .31; .03; .02 INJECTION, SOLUTION INTRAVENOUS at 10:01

## 2019-01-21 RX ADMIN — LIDOCAINE HYDROCHLORIDE 50 MG: 10 INJECTION, SOLUTION INFILTRATION; PERINEURAL at 01:01

## 2019-01-21 NOTE — ANESTHESIA PREPROCEDURE EVALUATION
01/21/2019  Phi Segovia Jr. is a 68 y.o., male.    Anesthesia Evaluation    I have reviewed the Patient Summary Reports.    I have reviewed the Nursing Notes.   I have reviewed the Medications.     Review of Systems  Anesthesia Hx:  No problems with previous Anesthesia    Cardiovascular:   Hypertension, well controlled hyperlipidemia    Hepatic/GI:   Liver Disease, (ROMEO)        Physical Exam  General:  Well nourished    Airway/Jaw/Neck:  Airway Findings: Mouth Opening: Normal Tongue: Normal  General Airway Assessment: Adult  Mallampati: I      Dental:  Dental Findings: In tact   Chest/Lungs:  Chest/Lungs Findings: Normal Respiratory Rate     Heart/Vascular:  Heart Findings: Rate: Normal             Anesthesia Plan  Type of Anesthesia, risks & benefits discussed:  Anesthesia Type:  MAC  Patient's Preference:   Intra-op Monitoring Plan: standard ASA monitors  Intra-op Monitoring Plan Comments:   Post Op Pain Control Plan: multimodal analgesia  Post Op Pain Control Plan Comments:   Induction:   IV  Beta Blocker:  Patient is not currently on a Beta-Blocker (No further documentation required).       Informed Consent: Patient understands risks and agrees with Anesthesia plan.  Questions answered. Anesthesia consent signed with patient.  ASA Score: 2     Day of Surgery Review of History & Physical:  There are no significant changes.          Ready For Surgery From Anesthesia Perspective.

## 2019-01-21 NOTE — ANESTHESIA RELEASE NOTE
"Anesthesia Release from PACU Note    Patient: Phi Segovia Jr.    Procedure(s) Performed: Procedure(s) (LRB):  EGD (ESOPHAGOGASTRODUODENOSCOPY) (N/A)    Anesthesia type: MAC    Post pain: Adequate analgesia    Post assessment: no apparent anesthetic complications, tolerated procedure well and no evidence of recall    Last Vitals:   Visit Vitals  BP (!) 134/56 (BP Location: Left arm, Patient Position: Lying)   Pulse 78   Temp 37.1 °C (98.8 °F) (Temporal)   Resp 18   Ht 6' 2" (1.88 m)   Wt 106.1 kg (234 lb)   SpO2 95%   BMI 30.04 kg/m²       Post vital signs: stable    Level of consciousness: awake, alert  and oriented    Nausea/Vomiting: no nausea/no vomiting    Complications: none    Airway Patency: patent    Respiratory: unassisted, spontaneous ventilation, room air    Cardiovascular: stable and blood pressure at baseline    Hydration: euvolemic  "

## 2019-01-21 NOTE — ANESTHESIA POSTPROCEDURE EVALUATION
"Anesthesia Post Evaluation    Patient: Phi Segovia Jr.    Procedure(s) Performed: Procedure(s) (LRB):  EGD (ESOPHAGOGASTRODUODENOSCOPY) (N/A)    Final Anesthesia Type: MAC  Patient location during evaluation: GI PACU  Patient participation: Yes- Able to Participate  Level of consciousness: awake and alert  Post-procedure vital signs: reviewed and stable  Pain management: adequate  Airway patency: patent  PONV status at discharge: No PONV  Anesthetic complications: no      Cardiovascular status: blood pressure returned to baseline  Respiratory status: unassisted and spontaneous ventilation  Hydration status: euvolemic  Follow-up not needed.        Visit Vitals  BP (!) 134/56 (BP Location: Left arm, Patient Position: Lying)   Pulse 78   Temp 37.1 °C (98.8 °F) (Temporal)   Resp 18   Ht 6' 2" (1.88 m)   Wt 106.1 kg (234 lb)   SpO2 95%   BMI 30.04 kg/m²       Pain/Janna Score: Janna Score: 10 (1/21/2019  1:47 PM)        "

## 2019-01-21 NOTE — TRANSFER OF CARE
"Anesthesia Transfer of Care Note    Patient: Phi Segovia Jr.    Procedure(s) Performed: Procedure(s) (LRB):  EGD (ESOPHAGOGASTRODUODENOSCOPY) (N/A)    Patient location: GI    Anesthesia Type: MAC    Transport from OR: Transported from OR on room air with adequate spontaneous ventilation    Post pain: adequate analgesia    Post assessment: no apparent anesthetic complications    Post vital signs: stable    Level of consciousness: awake, alert and oriented    Nausea/Vomiting: no nausea/vomiting    Complications: none    Transfer of care protocol was followed      Last vitals:   Visit Vitals  BP (!) 162/83 (BP Location: Left arm, Patient Position: Sitting)   Pulse 80   Temp 37.2 °C (99 °F) (Tympanic)   Resp 18   Ht 6' 2" (1.88 m)   Wt 106.1 kg (234 lb)   SpO2 95%   BMI 30.04 kg/m²     "

## 2019-01-21 NOTE — [employer] PATIENT INSTRUCTIONS
Discharge Summary/Instructions after an Endoscopic Procedure  Patient Name: Phi Segovia  Patient MRN: [id number]  Patient YOB: 1950 Monday, January 21, 2019 Analy Fair MD  RESTRICTIONS:  During your procedure today, you received medications for sedation.  These   medications may affect your judgment, balance and coordination.  Therefore,   for 24 hours, you have the following restrictions:   - DO NOT drive a car, operate machinery, make legal/financial decisions,   sign important papers or drink alcohol.    ACTIVITY:  Today: no heavy lifting, straining or running due to procedural   sedation/anesthesia.  The following day: return to full activity including work.  DIET:  Eat and drink normally unless instructed otherwise.     TREATMENT FOR COMMON SIDE EFFECTS:  - Mild abdominal pain, nausea, belching, bloating or excessive gas:  rest,   eat lightly and use a heating pad.  - Sore Throat: treat with throat lozenges and/or gargle with warm salt   water.  - Because air was used during the procedure, expelling large amounts of air   from your rectum or belching is normal.  - If a bowel prep was taken, you may not have a bowel movement for 1-3 days.    This is normal.  SYMPTOMS TO WATCH FOR AND REPORT TO YOUR PHYSICIAN:  1. Abdominal pain or bloating, other than gas cramps.  2. Chest pain.  3. Back pain.  4. Signs of infection such as: chills or fever occurring within 24 hours   after the procedure.  5. Rectal bleeding, which would show as bright red, maroon, or black stools.   (A tablespoon of blood from the rectum is not serious, especially if   hemorrhoids are present.)  6. Vomiting.  7. Weakness or dizziness.  GO DIRECTLY TO THE NEAREST EMERGENCY ROOM IF YOU HAVE ANY OF THE FOLLOWING:      Difficulty breathing              Chills and/or fever over 101 F   Persistent vomiting and/or vomiting blood   Severe abdominal pain   Severe chest pain   Black, tarry stools   Bleeding- more than one  tablespoon   Any other symptom or condition that you feel may need urgent attention  Your doctor recommends these additional instructions:  If any biopsies were taken, your doctors clinic will contact you in 1 to 2   weeks with any results.  - Patient has a contact number available for emergencies.  The signs and   symptoms of potential delayed complications were discussed with the   patient.  Return to normal activities tomorrow.  Written discharge   instructions were provided to the patient.   - Resume previous diet.   - Continue present medications.   - No aspirin, ibuprofen, naproxen, or other non-steroidal anti-inflammatory   drugs.   - Await pathology results.   - Give a beta blocker with dosage titrated by the heart rate.   - Return to GI clinic as previously scheduled.   - Discharge patient to home.  For questions, problems or results please call your physician Analy Fair MD at Work:  [phone]  If you have any questions about the above instructions, call the GI   department at [phone] or call the endoscopy unit at [phone]   from 7am until 3 pm.  [facility] [place], EMERGENCY ROOM PHONE NUMBER:   [phone]  IF A COMPLICATION OR EMERGENCY SITUATION ARISES AND YOU ARE UNABLE TO REACH   YOUR PHYSICIAN - GO DIRECTLY TO THE EMERGENCY ROOM.  I have read or have had read to me these discharge instructions for my   procedure and have received a written copy.  I understand these   instructions and will follow-up with my physician if I have any questions.     __________________________________       _____________________________________  Nurse Signature                                          Patient/Designated   Responsible Party Signature  Analy Fair MD  1/21/2019 1:33:01 PM  This report has been verified and signed electronically.  [employer]

## 2019-01-21 NOTE — H&P
Short Stay Endoscopy History and Physical    PCP - Zander Segovia MD    Procedure - EGD    Cirrhosis has been stable. Needs variceal surveillance.  No previous variceal bleeding.    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    Surgical History:  has no past surgical history on file.    Family History: family history is not on file.. Otherwise no colon cancer, inflammatory bowel disease, or GI malignancies.    Social History:  reports that  has never smoked. he has never used smokeless tobacco. He reports that he drinks alcohol. He reports that he does not use drugs.    Review of patient's allergies indicates:   Allergen Reactions    Sulfa (sulfonamide antibiotics) Nausea And Vomiting       Medications:   Medications Prior to Admission   Medication Sig Dispense Refill Last Dose    blood-glucose meter Misc AS DIRECTED  0 1/20/2019 at Unknown time    fish oil-omega-3 fatty acids 300-1,000 mg capsule Take 2 g by mouth once daily.   1/20/2019 at Unknown time    losartan-hydrochlorothiazide 100-12.5 mg (HYZAAR) 100-12.5 mg Tab Take 1 tablet by mouth once daily.    1/21/2019 at Unknown time    metFORMIN (GLUCOPHAGE) 500 MG tablet Take 500 mg by mouth 2 (two) times daily.    1/20/2019 at Unknown time    multivitamin (ONE DAILY MULTIVITAMIN) per tablet Take 1 tablet by mouth once daily.   1/20/2019 at Unknown time    ezetimibe (ZETIA) 10 mg tablet    Taking       Objective Findings:    Vital Signs:   Vitals:    01/21/19 1021   BP: (!) 162/83   Pulse: 80   Resp: 18    Temp: 99 °F (37.2 °C)         Physical Exam:  General Appearance: Well appearing in no acute distress  Eyes:    No scleral icterus  ENT: Neck supple, Lips, mucosa, and tongue normal; teeth and gums normal  Lungs: CTA bilaterally in anterior and posterior fields, no wheezes, no crackles.  Heart:  Regular rate, S1, S2 normal, no murmurs heard.  Abdomen: Soft, non tender, non distended with normal bowel sounds. No hepatosplenomegaly, ascites, or mass.  Extremities: No clubbing, cyanosis or edema  Skin: No rash    Labs:  Lab Results   Component Value Date    WBC 4.60 10/12/2018    HGB 14.7 10/12/2018    HCT 43.0 10/12/2018    PLT 81 (L) 10/12/2018    CHOL 159 04/13/2018    TRIG 108 04/13/2018    HDL 37 (L) 04/13/2018    ALT 87 (H) 10/12/2018    AST 80 (H) 10/12/2018     10/12/2018    K 3.9 10/12/2018     10/12/2018    CREATININE 0.9 10/12/2018    BUN 11 10/12/2018    CO2 27 10/12/2018    PSA 1.3 04/13/2018    INR 1.2 10/12/2018    HGBA1C 5.4 04/13/2018       I have explained the risks and benefits of endoscopy procedures to the patient including but not limited to bleeding, perforation, infection, and death.    Proceed with EGD for variceal surveillance.

## 2019-01-21 NOTE — DISCHARGE INSTRUCTIONS
Duodenitis    The duodenum is the first part of the small intestine, just past the stomach. Duodenitis is inflammation of the lining of the duodenum. This sheet tells you more about the condition.  Causes of duodenitis  The most common cause of duodenitis is infection by Helicobacter pylori (H. pylori) bacteria. Another common cause is long-term use of NSAIDs (such as aspirin and ibuprofen). Celiac disease, an allergy to gluten, causes a particular type of inflammation in the duodenum along with other changes. Less commonly, duodenitis happens along with another health problem, such as Crohn's disease. Drinking alcohol, smoking, or taking certain medicines also may make duodenitis more likely to happen.   Symptoms of duodenitis  The condition may cause no symptoms. If symptoms do happen, they can include:  · Burning, cramping, or hunger-like pain in your stomach  · Gas or a bloated feeling  · Nausea and vomiting  · Feeling full soon after starting a meal  Diagnosing duodenitis  Here is what will be done to diagnose duodenitis:  · If duodenitis is suspected, an upper endoscopy with biopsy will be done to confirm it. During this test, a thin, flexible tube with a light and camera on the end (endoscope) is used. The scope is moved down your throat to the stomach and into the duodenum. The scope sends images of the duodenum to a video screen. Small samples (biopsy) of the lining of the duodenum may be taken. These samples may be sent to a lab for testing for H. pylori.  · To check for H. pylori or other pathogens, a blood, stool, gastric biopsy, or breath test may be done. Samples of blood or stool are taken and tested in a lab. For a breath test, you swallow a harmless compound. If H. pylori bacteria are present, extra carbon dioxide gas can then be detected in your breath.  · To check for celiac disease, blood tests may be done. If positive, an upper endoscopy with biopsy is usually done to confirm the  diagnosis.   · In rare cases, an upper gastrointestinal (GI) series is done. This gives more information about the digestive tract. This procedure takes X-rays of the upper GI tract from the mouth to the small intestine.  Treating duodenitis  Duodenitis is treated using one or more of the following:  · Antibiotic medicines to kill H. pylori  · Medicines to reduce the amount of acid the stomach makes  · Stopping NSAIDs such as aspirin and ibuprofen. However, if you take aspirin for a medical condition, such as heart disease or stroke, do not stop until you check with your prescribing healthcare provider. If you take NSAIDs for arthritis or pain, check with your healthcare provider about alternatives.  · Adopting a gluten-free diet if celiac disease is the cause.  · Avoiding alcohol  · Stopping smoking  Your healthcare provider can tell you more about what treatments are needed.  Recovery and follow-up  With treatment, most cases of duodenitis clear up completely. In rare cases, duodenitis can be an ongoing (chronic) problem or can develop into a duodenal ulcer. If your symptoms do not improve or if they go away and come back, let your healthcare provider know. In such cases, regular healthcare provider visits and treatments are needed to manage your condition.   When to call the healthcare provider  Call your healthcare provider right away if you have any of the following:  · Fever of 100.4°F (38.0°C) or higher, or as advised by your healthcare provider  · Nausea or vomiting (vomit may be bloody or look like coffee grounds)  · Dark, tarry, or bloody stools  · Sudden or severe stomach pain  · Pain that does not improve with treatment  · Rapid weight loss   Date Last Reviewed: 7/1/2016  © 4625-5205 [employer]. [address] [place] [address], [place] [address]. All rights reserved. This information is not intended as a substitute for professional medical care. Always follow your healthcare professional's  instructions.        Gastritis (Adult)    Gastritis is inflammation and irritation of the stomach lining. It can be present for a short time (acute) or be long lasting (chronic). Gastritis is often caused by infection with bacteria called H pylori. More than a third of people in the [place] have this bacteria in their bodies. In many cases, H pylori causes no problems or symptoms. In some people, though, the infection irritates the stomach lining and causes gastritis. Other causes of stomach irritation include drinking alcohol or taking pain-relieving medicines called NSAIDs (such as aspirin or ibuprofen).   Symptoms of gastritis can include:  · Abdominal pain or bloating  · Loss of appetite  · Nausea or vomiting  · Vomiting blood or having black stools  · Feeling more tired than usual  An inflamed and irritated stomach lining is more likely to develop a sore called an ulcer. To help prevent this, gastritis should be treated.  Home care  If needed, medicines may be prescribed. If you have H pylori infection, treating it will likely relieve your symptoms. Other changes can help reduce stomach irritation and help it heal.  · If you have been prescribed medicines for H pylori infection, take them as directed. Take all of the medicine until it is finished or your healthcare provider tells you to stop, even if you feel better.  · Your healthcare provider may recommend avoiding NSAIDs. If you take daily aspirin for your heart or other medical reasons, do not stop without talking to your healthcare provider first.  · Avoid drinking alcohol.  · Stop smoking. Smoking can irritate the stomach and delay healing. As much as possible, stay away from second hand smoke.  Follow-up care  Follow up with your healthcare provider, or as advised by our staff. Testing may be needed to check for inflammation or an ulcer.  When to seek medical advice  Call your healthcare provider for any of the following:  · Stomach pain that gets worse or  moves to the lower right abdomen (appendix area)  · Chest pain that appears or gets worse, or spreads to the back, neck, shoulder, or arm  · Frequent vomiting (cant keep down liquids)  · Blood in the stool or vomit (red or black in color)  · Feeling weak or dizzy  · Fever of 100.4ºF (38ºC) or higher, or as directed by your healthcare provider  Date Last Reviewed: 6/22/2015  © 7407-9582 [employer]. [address] [place] [address]. All rights reserved. This information is not intended as a substitute for professional medical care. Always follow your healthcare professional's instructions.        Esophageal Varices     With esophageal varices, blood vessels in the esophagus become abnormally enlarged. They may then burst (rupture) and bleed.   Esophageal varices are enlarged veins at the lower end of the esophagus. The esophagus is the tube that carries food from your mouth to your stomach. Varices most often occur because of problems with blood flow in the liver caused by chronic liver disease. Normally, a blood vessel called the portal vein carries blood from the digestive organs to the liver. But with liver disease, blood flow can be blocked due to scarring of the liver. This increases the blood pressure in the portal vein (a condition known as portal hypertension). Blood then backs up in nearby veins in the esophagus and stomach, causing varices. Varices are a serious and deadly problem. Treatment is needed to prevent them from bursting (rupturing) and bleeding. If bleeding occurs, it can be fatal.  Symptoms of esophageal varices  Symptoms do not occur unless the varices are bleeding. This is an emergency problem. If you have any of the following symptoms, get medical attention right away:  · Vomiting blood  · Black, tarry, or bloody stools  · Feeling lightheaded, or fainting (loss of consciousness)  Diagnosing esophageal \varices  Youll likely be checked for varices if you have liver  disease or other health problems that can cause them. Your healthcare provider will ask about your symptoms and health history. Youll also be examined. Tests are then done to confirm the problem. Tests can include:  · Upper endoscopy. This is done to see inside the upper digestive tract. During the test, an endoscope is used. This is a thin, flexible tube with a tiny camera on the end. Its inserted through your mouth. Its then guided down through your esophagus, stomach, and first part of your small intestine. This allows the provider to check for varices and find any bleeding.  · Imaging tests. These provide pictures of the liver or blood flow in the liver. They allow the provider to check for enlarged veins around the liver and assess the risk of bleeding. Common imaging tests done include ultrasound and CT scans.  Treating esophageal varices  The goal of treatment is to reduce the risk of bleeding or to control bleeding. Treatment can include 1 or more of the following:  · Medicines. These may be prescribed to lower the blood pressure inside the enlarged veins. This reduces the risk of bleeding. Beta-blockers are the most common medicine used.  · Endoscopic therapy. These are treatments for enlarged or bleeding veins that are done using an endoscope. With ligation, small rubber bands are placed around the veins to close them off and stop any bleeding. With sclerotherapy, a blood-clotting medicine is injected into the veins to cause scarring and shrink them.  · Balloon tamponade. A tube with a balloon is guided down into your esophagus and stomach. The balloon is then filled with air. This puts pressure on enlarged or bleeding veins to control bleeding. This is a short-term (temporary) way to control bleeding until other treatments are available.   · Surgery. This may be done to place a tubelike device (stent) in the liver. The stent helps redirect blood flow in the liver to lower the blood pressure in enlarged  veins. Sometimes, the enlarged veins may be connected to other nearby veins to redirect blood flow. In severe cases, a liver transplant may be needed. For this surgery, a diseased liver is replaced with a healthy liver from another person.   Follow-up  Regular visits with your provider are needed to check for bleeding of the varices. If bleeding occurs, it is likely to occur again. More treatments will then be needed in the future. Once endoscopic therapy (banding) is performed, regular follow-up endoscopic scans with banding are done to completely get rid of the varices. If you are given medicines to take by mouth, be sure to take them as directed. Work closely with your provider to manage your condition. Know when to seek emergency care.  Date Last Reviewed: 7/1/2016 © 2000-2017 [employer]. [address] [place] [address], [place] [address]. All rights reserved. This information is not intended as a substitute for professional medical care. Always follow your healthcare professional's instructions.        Upper GI Endoscopy     During endoscopy, a long, flexible tube is used to view the inside of your upper GI tract.      Upper GI endoscopy allows your healthcare provider to look directly into the beginning of your gastrointestinal (GI) tract. The esophagus, stomach, and duodenum (the first part of the small intestine) make up the upper GI tract.   Before the exam  Follow these and any other instructions you are given before your endoscopy. If you dont follow the healthcare providers instructions carefully, the test may need to be canceled or done over:  · Don't eat or drink anything after midnight the night before your exam. If your exam is in the afternoon, drink only clear liquids in the morning. Don't eat or drink anything for 8 hours before the exam. In some cases, you may be able to take medicines with sips of water until 2 hours before the procedure. Speak with your healthcare provider about  this.   · Bring your X-rays and any other test results you have.  · Because you will be sedated, arrange for an adult to drive you home after the exam.  · Tell your healthcare provider before the exam if you are taking any medicines or have any medical problems.  The procedure  Here is what to expect:  · You will lie on the endoscopy table. Usually patients lie on the left side.  · You will be monitored and given oxygen.  · Your throat may be numbed with a spray or gargle. You are given medicine through an intravenous (IV) line that will help you relax and remain comfortable. You may be awake or asleep during the procedure.  · The healthcare provider will put the endoscope in your mouth and down your esophagus. It is thinner than most pieces of food that you swallow. It will not affect your breathing. The medicine helps keep you from gagging.  · Air is put into your GI tract to expand it. It can make you burp.  · During the procedure, the healthcare provider can take biopsies (tissue samples), remove abnormalities, such as polyps, or treat abnormalities through a variety of devices placed through the endoscope. You will not feel this.   · The endoscope carries images of your upper GI tract to a video screen. If you are awake, you may be able to look at the images.  · After the procedure is done, you will rest for a time. An adult must drive you home.  When to call your healthcare provider  Contact your healthcare provider if you have:  · Black or tarry stools, or blood in your stool  · Fever  · Pain in your belly that does not go away  · Nausea and vomiting, or vomiting blood   Date Last Reviewed: 7/1/2016 © 2000-2017 [employer]. [address] [place] [address], [place] [address]. All rights reserved. This information is not intended as a substitute for professional medical care. Always follow your healthcare professional's instructions.

## 2019-01-22 VITALS
OXYGEN SATURATION: 95 % | WEIGHT: 234 LBS | RESPIRATION RATE: 18 BRPM | TEMPERATURE: 99 F | HEART RATE: 78 BPM | HEIGHT: 74 IN | SYSTOLIC BLOOD PRESSURE: 134 MMHG | DIASTOLIC BLOOD PRESSURE: 56 MMHG | BODY MASS INDEX: 30.03 KG/M2

## 2019-01-25 ENCOUNTER — TELEPHONE (OUTPATIENT)
Dept: GASTROENTEROLOGY | Facility: CLINIC | Age: 69
End: 2019-01-25

## 2019-01-30 ENCOUNTER — TELEPHONE (OUTPATIENT)
Dept: GASTROENTEROLOGY | Facility: CLINIC | Age: 69
End: 2019-01-30

## 2019-01-30 DIAGNOSIS — I85.10 SECONDARY ESOPHAGEAL VARICES WITHOUT BLEEDING: Primary | ICD-10-CM

## 2019-01-30 RX ORDER — PROPRANOLOL HYDROCHLORIDE 10 MG/1
10 TABLET ORAL 2 TIMES DAILY
Qty: 60 TABLET | Refills: 5 | Status: SHIPPED | OUTPATIENT
Start: 2019-01-30 | End: 2019-09-10 | Stop reason: SDUPTHER

## 2019-01-30 NOTE — TELEPHONE ENCOUNTER
----- Message from Jevon Nolasco sent at 1/30/2019  4:33 PM CST -----  Contact: [employer] [facility]   She is calling in regards to receiving the actual prescription needed for the pt's medication and they have waiting for the past week,please advise [phone]

## 2019-01-30 NOTE — TELEPHONE ENCOUNTER
----- Message from Tonya Chapman sent at 1/28/2019  4:08 PM CST -----  Contact: [employer] [facility] [phone]  Would like to consult with the nurse, trying to get  Patients RX medication, still wating on the approval, please call back at [phone] thanks theo,

## 2019-01-30 NOTE — TELEPHONE ENCOUNTER
Prescription for propranolol 10 mg twice a day sent to patient's pharmacy.  He should monitor pulse.  The goal is to have a heart rate in the 60s.

## 2019-01-30 NOTE — TELEPHONE ENCOUNTER
Patients pharmacy called and stated his medication Corgard isn't covered. Please send new medication to pharmacy ASAP.

## 2019-01-31 ENCOUNTER — TELEPHONE (OUTPATIENT)
Dept: GASTROENTEROLOGY | Facility: CLINIC | Age: 69
End: 2019-01-31

## 2019-01-31 NOTE — TELEPHONE ENCOUNTER
Called and spoke to pt - told new medication sent to his pharmacy to be taken twice daily.  Dr. Fair asked that the pt monitor his pulse while on medication and the goal should be that his pulse stay in the 60's.  Pt verbalized his understanding and will keep us apprised of his pulse rate.

## 2019-03-06 ENCOUNTER — TELEPHONE (OUTPATIENT)
Dept: GASTROENTEROLOGY | Facility: CLINIC | Age: 69
End: 2019-03-06

## 2019-03-06 NOTE — TELEPHONE ENCOUNTER
----- Message from Vickie Santana sent at 3/6/2019  1:04 PM CST -----  Contact: Yu with [employer] [facility]  Calling concerning Propranolol 10 mg @ 2 x daily. Patient is requesting a 90 day supply. Please call Yu @ [phone]. Thanks, tc

## 2019-03-06 NOTE — TELEPHONE ENCOUNTER
Informed Yu with [employer] [facility], a 90 day supply for Propranolol 10 mg vance a day is approved, MARQUITA.

## 2019-04-11 ENCOUNTER — TELEPHONE (OUTPATIENT)
Dept: RADIOLOGY | Facility: HOSPITAL | Age: 69
End: 2019-04-11

## 2019-04-12 ENCOUNTER — HOSPITAL ENCOUNTER (OUTPATIENT)
Dept: RADIOLOGY | Facility: HOSPITAL | Age: 69
Discharge: HOME | End: 2019-04-12
Attending: NURSE PRACTITIONER
Payer: MEDICARE

## 2019-04-12 ENCOUNTER — OFFICE VISIT (OUTPATIENT)
Dept: GASTROENTEROLOGY | Facility: CLINIC | Age: 69
End: 2019-04-12
Payer: MEDICARE

## 2019-04-12 VITALS
DIASTOLIC BLOOD PRESSURE: 90 MMHG | HEART RATE: 80 BPM | HEIGHT: 74 IN | SYSTOLIC BLOOD PRESSURE: 150 MMHG | WEIGHT: 231.25 LBS | BODY MASS INDEX: 29.68 KG/M2

## 2019-04-12 DIAGNOSIS — K74.60 LIVER CIRRHOSIS SECONDARY TO NASH: Primary | ICD-10-CM

## 2019-04-12 DIAGNOSIS — I85.10 SECONDARY ESOPHAGEAL VARICES WITHOUT BLEEDING: ICD-10-CM

## 2019-04-12 DIAGNOSIS — K75.81 LIVER CIRRHOSIS SECONDARY TO NASH: ICD-10-CM

## 2019-04-12 DIAGNOSIS — K74.60 LIVER CIRRHOSIS SECONDARY TO NASH: ICD-10-CM

## 2019-04-12 DIAGNOSIS — K75.81 LIVER CIRRHOSIS SECONDARY TO NASH: Primary | ICD-10-CM

## 2019-04-12 PROCEDURE — 99214 PR OFFICE/OUTPT VISIT, EST, LEVL IV, 30-39 MIN: ICD-10-PCS | Mod: S$PBB,,, | Performed by: NURSE PRACTITIONER

## 2019-04-12 PROCEDURE — 99999 PR PBB SHADOW E&M-EST. PATIENT-LVL III: CPT | Mod: PBBFAC,,, | Performed by: NURSE PRACTITIONER

## 2019-04-12 PROCEDURE — 76705 US ABDOMEN LIMITED: ICD-10-PCS | Mod: 26,,, | Performed by: RADIOLOGY

## 2019-04-12 PROCEDURE — 76705 ECHO EXAM OF ABDOMEN: CPT | Mod: TC

## 2019-04-12 PROCEDURE — 99213 OFFICE O/P EST LOW 20 MIN: CPT | Mod: PBBFAC,25,PN | Performed by: NURSE PRACTITIONER

## 2019-04-12 PROCEDURE — 76705 ECHO EXAM OF ABDOMEN: CPT | Mod: 26,,, | Performed by: RADIOLOGY

## 2019-04-12 PROCEDURE — 99214 OFFICE O/P EST MOD 30 MIN: CPT | Mod: S$PBB,,, | Performed by: NURSE PRACTITIONER

## 2019-04-12 PROCEDURE — 99999 PR PBB SHADOW E&M-EST. PATIENT-LVL III: ICD-10-PCS | Mod: PBBFAC,,, | Performed by: NURSE PRACTITIONER

## 2019-04-12 RX ORDER — OMEPRAZOLE 10 MG/1
10 CAPSULE, DELAYED RELEASE ORAL DAILY
COMMUNITY

## 2019-04-12 RX ORDER — LORATADINE 10 MG/1
10 TABLET ORAL DAILY
COMMUNITY

## 2019-05-15 ENCOUNTER — TELEPHONE (OUTPATIENT)
Dept: GASTROENTEROLOGY | Facility: CLINIC | Age: 69
End: 2019-05-15

## 2019-05-15 NOTE — TELEPHONE ENCOUNTER
----- Message from Selena Munoz sent at 5/15/2019 11:14 AM CDT -----  Contact: Pt   Pt is calling regarding requesting to have nurse call pt back.  Pt is calling with questions concerning whether last test  On 04/12/2019 was sent to primary care doctor. Dr. Zander Segovia in [place]. Pt states that she would like nurse call with answer. .[phone] (home)           ..Thank You  Mera Munoz

## 2019-06-13 ENCOUNTER — HISTORICAL (OUTPATIENT)
Dept: LAB | Facility: HOSPITAL | Age: 69
End: 2019-06-13

## 2019-06-13 LAB
CHOLEST SERPL-MCNC: 156 MG/DL (ref 50–200)
CHOLEST/HDLC SERPL: 5 {RATIO} (ref 0–5)
EST. AVERAGE GLUCOSE BLD GHB EST-MCNC: 126 MG/DL
HBA1C MFR BLD: 6 % (ref 4.5–6.2)
HDLC SERPL-MCNC: 30 MG/DL (ref 35–60)
LDLC SERPL CALC-MCNC: 92 MG/DL (ref 50–140)
TRIGL SERPL-MCNC: 170 MG/DL (ref 30–150)
VLDLC SERPL CALC-MCNC: 34 MG/DL

## 2019-08-07 ENCOUNTER — TELEPHONE (OUTPATIENT)
Dept: GASTROENTEROLOGY | Facility: CLINIC | Age: 69
End: 2019-08-07

## 2019-08-07 NOTE — TELEPHONE ENCOUNTER
----- Message from Idalmis Watts sent at 8/7/2019  9:09 AM CDT -----  Contact: self [phone]  Pt would like return call back regarding questions about diet.  Please call back at [phone].  Blayne Mcintosh

## 2019-09-10 DIAGNOSIS — I85.10 SECONDARY ESOPHAGEAL VARICES WITHOUT BLEEDING: ICD-10-CM

## 2019-09-11 RX ORDER — PROPRANOLOL HYDROCHLORIDE 10 MG/1
TABLET ORAL
Qty: 180 TABLET | Refills: 1 | Status: SHIPPED | OUTPATIENT
Start: 2019-09-11

## 2019-10-09 ENCOUNTER — TELEPHONE (OUTPATIENT)
Dept: RADIOLOGY | Facility: HOSPITAL | Age: 69
End: 2019-10-09

## 2019-10-10 ENCOUNTER — TELEPHONE (OUTPATIENT)
Dept: GASTROENTEROLOGY | Facility: CLINIC | Age: 69
End: 2019-10-10

## 2019-10-10 ENCOUNTER — HOSPITAL ENCOUNTER (OUTPATIENT)
Dept: RADIOLOGY | Facility: HOSPITAL | Age: 69
Discharge: HOME | End: 2019-10-10
Attending: NURSE PRACTITIONER
Payer: MEDICARE

## 2019-10-10 ENCOUNTER — OFFICE VISIT (OUTPATIENT)
Dept: GASTROENTEROLOGY | Facility: CLINIC | Age: 69
End: 2019-10-10
Payer: MEDICARE

## 2019-10-10 VITALS
OXYGEN SATURATION: 99 % | HEART RATE: 61 BPM | SYSTOLIC BLOOD PRESSURE: 138 MMHG | WEIGHT: 223.13 LBS | DIASTOLIC BLOOD PRESSURE: 70 MMHG | BODY MASS INDEX: 28.65 KG/M2

## 2019-10-10 DIAGNOSIS — K75.81 LIVER CIRRHOSIS SECONDARY TO NASH: ICD-10-CM

## 2019-10-10 DIAGNOSIS — I85.10 SECONDARY ESOPHAGEAL VARICES WITHOUT BLEEDING: ICD-10-CM

## 2019-10-10 DIAGNOSIS — K75.81 LIVER CIRRHOSIS SECONDARY TO NASH: Primary | ICD-10-CM

## 2019-10-10 DIAGNOSIS — K74.60 LIVER CIRRHOSIS SECONDARY TO NASH: ICD-10-CM

## 2019-10-10 DIAGNOSIS — K74.60 LIVER CIRRHOSIS SECONDARY TO NASH: Primary | ICD-10-CM

## 2019-10-10 PROCEDURE — 99214 OFFICE O/P EST MOD 30 MIN: CPT | Mod: S$PBB,,, | Performed by: NURSE PRACTITIONER

## 2019-10-10 PROCEDURE — 99999 PR PBB SHADOW E&M-EST. PATIENT-LVL III: CPT | Mod: PBBFAC,,, | Performed by: NURSE PRACTITIONER

## 2019-10-10 PROCEDURE — 99213 OFFICE O/P EST LOW 20 MIN: CPT | Mod: PBBFAC | Performed by: NURSE PRACTITIONER

## 2019-10-10 PROCEDURE — 99999 PR PBB SHADOW E&M-EST. PATIENT-LVL III: ICD-10-PCS | Mod: PBBFAC,,, | Performed by: NURSE PRACTITIONER

## 2019-10-10 PROCEDURE — 99214 PR OFFICE/OUTPT VISIT, EST, LEVL IV, 30-39 MIN: ICD-10-PCS | Mod: S$PBB,,, | Performed by: NURSE PRACTITIONER

## 2019-10-10 NOTE — PROGRESS NOTES
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 A diagnosis of Secondary esophageal varices without bleeding was also pertinent to this visit.    PCP: Zander Segovia       HPI:  This is a 69 y.o. male here for follow up of the above  Pt states that he has been feeling overall well without any GI complaints.   Denies any abdominal pain.  No nausea or vomiting.  No change in bowel pattern.  No melena or hematochezia. No weight loss.  No upper GI bleeding.  No ascites or BLE.  No overt confusion.      Review of Systems   Constitutional: Negative for chills, fever, malaise/fatigue and weight loss.   Respiratory: Negative for cough.    Cardiovascular: Negative for chest pain.   Gastrointestinal:        Per HPI   Musculoskeletal: Negative for myalgias.   Skin: Negative for itching and rash.   Neurological: Negative for headaches.   Psychiatric/Behavioral: The patient is not nervous/anxious.        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 No pertinent family history.    Social History:   Social History     Tobacco Use    Smoking status: Never Smoker    Smokeless tobacco: Never Used   Substance Use Topics    Alcohol use: Yes     Comment: socially    Drug use: No       Allergies: Reviewed    Home Medications:  Current Outpatient Medications on File Prior to Visit   Medication Sig Dispense Refill    blood-glucose meter Misc AS DIRECTED  0    fish oil-omega-3 fatty acids 300-1,000 mg capsule Take 2 g by mouth once  daily.      loratadine (CLARITIN) 10 mg tablet Take 10 mg by mouth once daily.      losartan-hydrochlorothiazide 100-12.5 mg (HYZAAR) 100-12.5 mg Tab Take 1 tablet by mouth once daily.       metFORMIN (GLUCOPHAGE) 500 MG tablet Take 500 mg by mouth 2 (two) times daily.       multivitamin (ONE DAILY MULTIVITAMIN) per tablet Take 1 tablet by mouth once daily.      propranolol (INDERAL) 10 MG tablet TAKE ONE TABLET BY MOUTH TWICE A  tablet 1    omeprazole (PRILOSEC) 10 MG capsule Take 10 mg by mouth once daily.       No current facility-administered medications on file prior to visit.        Physical Exam:  Vital Signs:  /70   Pulse 61   Wt 101.2 kg (223 lb 1.7 oz)   SpO2 99%   BMI 28.65 kg/m²   Body mass index is 28.65 kg/m².  Physical Exam   Constitutional: He is oriented to person, place, and time. He appears well-developed.   HENT:   Head: Normocephalic.   Neck: Normal range of motion.   Cardiovascular: Normal rate.   Pulmonary/Chest: Effort normal.   Abdominal: He exhibits no distension.   Musculoskeletal: Normal range of motion.   Neurological: He is alert and oriented to person, place, and time.   Skin: Skin is warm and dry.   Psychiatric: He has a normal mood and affect.   Vitals reviewed.      Labs: Pertinent labs reviewed.  MELD-Na score: 9 at 4/12/2019  7:32 AM  MELD score: 9 at 4/12/2019  7:32 AM  Calculated from:  Serum Creatinine: 0.9 mg/dL (Rounded to 1 mg/dL) at 4/12/2019  7:32 AM  Serum Sodium: 141 mmol/L (Rounded to 137 mmol/L) at 4/12/2019  7:32 AM  Total Bilirubin: 1.2 mg/dL at 4/12/2019  7:32 AM  INR(ratio): 1.2 at 4/12/2019  7:32 AM  Age: 68 years  EV: On BB  HCC: US 4/19 without lesion or mass    Assessment:  1. 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 Secondary esophageal varices without bleeding        Recommendations:  - stable without new complaints.  Low MELD  - continue MELD labs and HCC surveillance every 6 months  - weight loss with improved nutrition encouraged  -  Continue current medications.     Return to Clinic:  6 months with pre-clinic labs and imaging.

## 2019-10-23 ENCOUNTER — TELEPHONE (OUTPATIENT)
Dept: RADIOLOGY | Facility: HOSPITAL | Age: 69
End: 2019-10-23

## 2019-10-24 ENCOUNTER — HOSPITAL ENCOUNTER (OUTPATIENT)
Dept: RADIOLOGY | Facility: HOSPITAL | Age: 69
Discharge: HOME | End: 2019-10-24
Attending: NURSE PRACTITIONER
Payer: MEDICARE

## 2019-10-24 PROCEDURE — 76705 ECHO EXAM OF ABDOMEN: CPT | Mod: TC

## 2019-10-24 PROCEDURE — 76705 US ABDOMEN LIMITED: ICD-10-PCS | Mod: 26,,, | Performed by: RADIOLOGY

## 2019-10-24 PROCEDURE — 76705 ECHO EXAM OF ABDOMEN: CPT | Mod: 26,,, | Performed by: RADIOLOGY

## 2019-11-18 ENCOUNTER — TELEPHONE (OUTPATIENT)
Dept: GASTROENTEROLOGY | Facility: CLINIC | Age: 69
End: 2019-11-18

## 2019-11-18 NOTE — TELEPHONE ENCOUNTER
----- Message from Jose Bledsoe sent at 11/18/2019 11:09 AM CST -----  Contact: pt  Please give pt a call at [phone] regarding some questions he has about a medication.

## 2019-11-18 NOTE — TELEPHONE ENCOUNTER
Spoke with patient who states that he started on metamucil fiber and also wanted to confirm follow-up appointment with Dr. Fair. Confirmed appointment with patient who had no further concerns.

## 2019-12-06 ENCOUNTER — TELEPHONE (OUTPATIENT)
Dept: GASTROENTEROLOGY | Facility: CLINIC | Age: 69
End: 2019-12-06

## 2019-12-06 NOTE — TELEPHONE ENCOUNTER
Spoke with patient who is requesting most recent ultrasound and labs be faxed to Dr. Zander Segovia office. Made patient aware that I would fax records this morning.    Records faxed at 10:21 am.

## 2019-12-06 NOTE — TELEPHONE ENCOUNTER
----- Message from Liz Gore sent at 12/6/2019  9:58 AM CST -----  Contact: patient   Patient requesting a call back regarding faxing blood report and US to his primary care Zander Segovia.Please fax to [phone].      Thanks,  Liz Gore

## 2020-03-05 DIAGNOSIS — I85.10 SECONDARY ESOPHAGEAL VARICES WITHOUT BLEEDING: ICD-10-CM

## 2020-03-06 RX ORDER — PROPRANOLOL HYDROCHLORIDE 10 MG/1
TABLET ORAL
Qty: 180 TABLET | Refills: 1 | OUTPATIENT
Start: 2020-03-06

## 2020-04-01 ENCOUNTER — PATIENT MESSAGE (OUTPATIENT)
Dept: GASTROENTEROLOGY | Facility: CLINIC | Age: 70
End: 2020-04-01

## 2020-05-20 ENCOUNTER — TELEPHONE (OUTPATIENT)
Dept: RADIOLOGY | Facility: HOSPITAL | Age: 70
End: 2020-05-20

## 2020-05-21 ENCOUNTER — OFFICE VISIT (OUTPATIENT)
Dept: GASTROENTEROLOGY | Facility: CLINIC | Age: 70
End: 2020-05-21
Payer: MEDICARE

## 2020-05-21 ENCOUNTER — HOSPITAL ENCOUNTER (OUTPATIENT)
Dept: RADIOLOGY | Facility: HOSPITAL | Age: 70
Discharge: HOME | End: 2020-05-21
Attending: NURSE PRACTITIONER
Payer: MEDICARE

## 2020-05-21 VITALS
DIASTOLIC BLOOD PRESSURE: 80 MMHG | BODY MASS INDEX: 28.54 KG/M2 | HEIGHT: 75 IN | HEART RATE: 66 BPM | SYSTOLIC BLOOD PRESSURE: 128 MMHG | WEIGHT: 229.5 LBS

## 2020-05-21 DIAGNOSIS — K74.60 LIVER CIRRHOSIS SECONDARY TO NASH: Primary | ICD-10-CM

## 2020-05-21 DIAGNOSIS — E11.9 TYPE 2 DIABETES MELLITUS WITHOUT COMPLICATION, UNSPECIFIED WHETHER LONG TERM INSULIN USE: ICD-10-CM

## 2020-05-21 DIAGNOSIS — K74.60 LIVER CIRRHOSIS SECONDARY TO NASH: ICD-10-CM

## 2020-05-21 DIAGNOSIS — K75.81 LIVER CIRRHOSIS SECONDARY TO NASH: ICD-10-CM

## 2020-05-21 DIAGNOSIS — I85.10 SECONDARY ESOPHAGEAL VARICES WITHOUT BLEEDING: ICD-10-CM

## 2020-05-21 DIAGNOSIS — K75.81 LIVER CIRRHOSIS SECONDARY TO NASH: Primary | ICD-10-CM

## 2020-05-21 PROCEDURE — 99999 PR PBB SHADOW E&M-EST. PATIENT-LVL IV: CPT | Mod: PBBFAC,,, | Performed by: NURSE PRACTITIONER

## 2020-05-21 PROCEDURE — 99213 OFFICE O/P EST LOW 20 MIN: CPT | Mod: S$PBB,,, | Performed by: NURSE PRACTITIONER

## 2020-05-21 PROCEDURE — 76700 US EXAM ABDOM COMPLETE: CPT | Mod: TC

## 2020-05-21 PROCEDURE — 99214 OFFICE O/P EST MOD 30 MIN: CPT | Mod: PBBFAC,25 | Performed by: NURSE PRACTITIONER

## 2020-05-21 PROCEDURE — 99999 PR PBB SHADOW E&M-EST. PATIENT-LVL IV: ICD-10-PCS | Mod: PBBFAC,,, | Performed by: NURSE PRACTITIONER

## 2020-05-21 PROCEDURE — 76700 US EXAM ABDOM COMPLETE: CPT | Mod: 26,,, | Performed by: RADIOLOGY

## 2020-05-21 PROCEDURE — 99213 PR OFFICE/OUTPT VISIT, EST, LEVL III, 20-29 MIN: ICD-10-PCS | Mod: S$PBB,,, | Performed by: NURSE PRACTITIONER

## 2020-05-21 PROCEDURE — 76700 US ABDOMEN COMPLETE: ICD-10-PCS | Mod: 26,,, | Performed by: RADIOLOGY

## 2020-05-21 RX ORDER — AMLODIPINE BESYLATE 5 MG/1
TABLET ORAL
COMMUNITY
Start: 2020-03-03

## 2020-05-21 RX ORDER — OLMESARTAN MEDOXOMIL 40 MG/1
TABLET ORAL
COMMUNITY
Start: 2020-03-03

## 2020-05-21 RX ORDER — HYDROCHLOROTHIAZIDE 12.5 MG/1
TABLET ORAL
COMMUNITY
Start: 2020-03-03

## 2020-05-21 RX ORDER — CALCIUM CITRATE/VITAMIN D3 200MG-6.25
TABLET ORAL
COMMUNITY
Start: 2020-02-13

## 2020-05-21 RX ORDER — GLIMEPIRIDE 1 MG/1
TABLET ORAL
COMMUNITY
Start: 2020-03-12

## 2020-05-21 RX ORDER — MELOXICAM 15 MG/1
TABLET ORAL
COMMUNITY
Start: 2020-05-12

## 2020-05-21 RX ORDER — GLUCOSAM/CHON-MSM1/C/MANG/BOSW 500-416.6
TABLET ORAL
COMMUNITY
Start: 2020-02-13

## 2020-05-21 NOTE — PROGRESS NOTES
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 Diagnoses of Secondary esophageal varices without bleeding and Type 2 diabetes mellitus without complication, unspecified whether long term insulin use were also pertinent to this visit.    PCP: Zander Segovia       HPI:  This is a 69 y.o. male here for follow up of the above  Pt states that he has been feeling well without any new complaints.   Denies any abdominal pain.  No nausea or vomiting.  No change in bowel pattern.  No melena or hematochezia. No weight loss.  No upper GI bleeding.  No ascites or BLE.  No overt confusion.  DM is under control and being managed by PCP.        Review of Systems   Constitutional: Negative for chills, fever, malaise/fatigue and weight loss.   Respiratory: Negative for cough.    Cardiovascular: Negative for chest pain.   Gastrointestinal:        Per HPI   Musculoskeletal: Negative for myalgias.   Skin: Negative for itching and rash.   Neurological: Negative for headaches.   Psychiatric/Behavioral: The patient is not nervous/anxious.        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    Social History:   Social History     Tobacco Use    Smoking status: Never Smoker    Smokeless tobacco: Never Used   Substance Use Topics    Alcohol use: Yes     Comment: socially    Drug use: No       Allergies: Reviewed    Home Medications:  Current Outpatient Medications on File Prior to Visit   Medication Sig Dispense Refill     "amLODIPine (NORVASC) 5 MG tablet       blood-glucose meter Misc AS DIRECTED  0    fish oil-omega-3 fatty acids 300-1,000 mg capsule Take 2 g by mouth once daily.      glimepiride (AMARYL) 1 MG tablet       loratadine (CLARITIN) 10 mg tablet Take 10 mg by mouth once daily.      losartan-hydrochlorothiazide 100-12.5 mg (HYZAAR) 100-12.5 mg Tab Take 1 tablet by mouth once daily.       meloxicam (MOBIC) 15 MG tablet       metFORMIN (GLUCOPHAGE) 500 MG tablet Take 500 mg by mouth 2 (two) times daily.       MILK THISTLE ORAL Take by mouth.      multivitamin (ONE DAILY MULTIVITAMIN) per tablet Take 1 tablet by mouth once daily.      olmesartan (BENICAR) 40 MG tablet       omeprazole (PRILOSEC) 10 MG capsule Take 10 mg by mouth once daily.      propranolol (INDERAL) 10 MG tablet TAKE ONE TABLET BY MOUTH TWICE A  tablet 1    psyllium husk (METAMUCIL ORAL) Take by mouth.      TRUE METRIX GLUCOSE TEST STRIP Strp USE TO Check blood glucose TWICE DAILY      TRUEPLUS LANCETS 28 gauge Misc USE TO Check blood glucose TWICE DAILY      hydroCHLOROthiazide (HYDRODIURIL) 12.5 MG Tab        No current facility-administered medications on file prior to visit.        Physical Exam:  Vital Signs:  /80   Pulse 66   Ht 6' 3" (1.905 m)   Wt 104.1 kg (229 lb 8 oz)   BMI 28.69 kg/m²   Body mass index is 28.69 kg/m².  Physical Exam   Constitutional: He is oriented to person, place, and time. He appears well-developed.   HENT:   Head: Normocephalic.   Neck: Normal range of motion.   Cardiovascular: Normal rate.   Pulmonary/Chest: Effort normal.   Abdominal: Soft. He exhibits no distension.   Musculoskeletal: Normal range of motion.   Neurological: He is alert and oriented to person, place, and time.   Skin: Skin is warm and dry.   Psychiatric: He has a normal mood and affect.   Vitals reviewed.      Labs: Pertinent labs reviewed.  MELD-Na score: 10 at 10/10/2019  7:22 AM  MELD score: 10 at 10/10/2019  7:22 " AM  Calculated from:  Serum Creatinine: 0.9 mg/dL (Rounded to 1 mg/dL) at 10/10/2019  7:22 AM  Serum Sodium: 138 mmol/L (Rounded to 137 mmol/L) at 10/10/2019  7:22 AM  Total Bilirubin: 1.6 mg/dL at 10/10/2019  7:22 AM  INR(ratio): 1.2 at 10/10/2019  7:22 AM  Age: 69 years  EV: Grade III EV, no previous bleeding.  On BB  HCC:US 10/19 without lesion or mass    Assessment:  1. 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 Secondary esophageal varices without bleeding    3. Type 2 diabetes mellitus without complication, unspecified whether long term insulin use        Recommendations:  Stable without any GI complaints  Continue current medications.   continue with MELD labs and HCC surveillance every 6 months      Return to Clinic:  6 months with pre-clinic labs and imaging.

## 2020-05-22 ENCOUNTER — PATIENT MESSAGE (OUTPATIENT)
Dept: GASTROENTEROLOGY | Facility: CLINIC | Age: 70
End: 2020-05-22

## 2020-09-24 ENCOUNTER — HISTORICAL (OUTPATIENT)
Dept: RADIOLOGY | Facility: HOSPITAL | Age: 70
End: 2020-09-24

## 2020-11-18 ENCOUNTER — TELEPHONE (OUTPATIENT)
Dept: RADIOLOGY | Facility: HOSPITAL | Age: 70
End: 2020-11-18

## 2020-11-19 ENCOUNTER — HOSPITAL ENCOUNTER (OUTPATIENT)
Dept: RADIOLOGY | Facility: HOSPITAL | Age: 70
Discharge: HOME | End: 2020-11-19
Attending: NURSE PRACTITIONER
Payer: MEDICARE

## 2020-11-19 DIAGNOSIS — K75.81 LIVER CIRRHOSIS SECONDARY TO NASH: ICD-10-CM

## 2020-11-19 DIAGNOSIS — I85.10 SECONDARY ESOPHAGEAL VARICES WITHOUT BLEEDING: ICD-10-CM

## 2020-11-19 DIAGNOSIS — K74.60 LIVER CIRRHOSIS SECONDARY TO NASH: ICD-10-CM

## 2020-11-19 DIAGNOSIS — E11.9 TYPE 2 DIABETES MELLITUS WITHOUT COMPLICATION, UNSPECIFIED WHETHER LONG TERM INSULIN USE: ICD-10-CM

## 2020-11-19 PROCEDURE — 76700 US EXAM ABDOM COMPLETE: CPT | Mod: 26,,, | Performed by: RADIOLOGY

## 2020-11-19 PROCEDURE — 76700 US ABDOMEN COMPLETE: ICD-10-PCS | Mod: 26,,, | Performed by: RADIOLOGY

## 2020-11-19 PROCEDURE — 76700 US EXAM ABDOM COMPLETE: CPT | Mod: TC

## 2020-11-24 ENCOUNTER — TELEPHONE (OUTPATIENT)
Dept: GASTROENTEROLOGY | Facility: CLINIC | Age: 70
End: 2020-11-24

## 2020-11-24 NOTE — TELEPHONE ENCOUNTER
----- Message from Juliana Miranda sent at 11/24/2020 11:43 AM CST -----  Contact: pt  The pt request a return call, no additional info given and can be reached at [phone]///[id number]

## 2020-11-24 NOTE — TELEPHONE ENCOUNTER
Returned call to patient. He informed me that he could not get into his portal. I informed him from now on we would just do telephone calls. He verbalized understanding.

## 2020-11-25 DIAGNOSIS — N28.9 RENAL LESION: Primary | ICD-10-CM

## 2020-11-27 ENCOUNTER — PATIENT MESSAGE (OUTPATIENT)
Dept: GASTROENTEROLOGY | Facility: CLINIC | Age: 70
End: 2020-11-27

## 2020-11-30 ENCOUNTER — PATIENT MESSAGE (OUTPATIENT)
Dept: GASTROENTEROLOGY | Facility: CLINIC | Age: 70
End: 2020-11-30

## 2020-12-07 ENCOUNTER — TELEPHONE (OUTPATIENT)
Dept: GASTROENTEROLOGY | Facility: CLINIC | Age: 70
End: 2020-12-07

## 2020-12-07 DIAGNOSIS — N28.89 OTHER SPECIFIED DISORDERS OF KIDNEY AND URETER: ICD-10-CM

## 2020-12-07 NOTE — TELEPHONE ENCOUNTER
----- Message from Veronica Brown MA sent at 12/7/2020 10:00 AM CST -----  Regarding: FW: CT Order    ----- Message -----  From: RT Makeda  Sent: 12/7/2020   9:44 AM CST  To: Melisa BAILEY Staff  Subject: CT Order                                         Please change the order for Mr Segovia to with and without contrast and link it to his appointment since it is for a renal mass protocol.     Thanks,  Erika  [phone]

## 2020-12-09 ENCOUNTER — TELEPHONE (OUTPATIENT)
Dept: RADIOLOGY | Facility: HOSPITAL | Age: 70
End: 2020-12-09

## 2020-12-10 ENCOUNTER — HOSPITAL ENCOUNTER (OUTPATIENT)
Dept: RADIOLOGY | Facility: HOSPITAL | Age: 70
Discharge: HOME | End: 2020-12-10
Attending: NURSE PRACTITIONER
Payer: MEDICARE

## 2020-12-10 ENCOUNTER — OFFICE VISIT (OUTPATIENT)
Dept: GASTROENTEROLOGY | Facility: CLINIC | Age: 70
End: 2020-12-10
Payer: MEDICARE

## 2020-12-10 VITALS
WEIGHT: 232.56 LBS | HEIGHT: 74 IN | BODY MASS INDEX: 29.85 KG/M2 | DIASTOLIC BLOOD PRESSURE: 74 MMHG | SYSTOLIC BLOOD PRESSURE: 142 MMHG | HEART RATE: 61 BPM

## 2020-12-10 DIAGNOSIS — N28.9 RENAL LESION: ICD-10-CM

## 2020-12-10 DIAGNOSIS — I85.10 SECONDARY ESOPHAGEAL VARICES WITHOUT BLEEDING: ICD-10-CM

## 2020-12-10 DIAGNOSIS — K74.60 LIVER CIRRHOSIS SECONDARY TO NASH: Primary | ICD-10-CM

## 2020-12-10 DIAGNOSIS — N28.89 OTHER SPECIFIED DISORDERS OF KIDNEY AND URETER: ICD-10-CM

## 2020-12-10 DIAGNOSIS — K75.81 LIVER CIRRHOSIS SECONDARY TO NASH: Primary | ICD-10-CM

## 2020-12-10 PROCEDURE — 74170 CT ABD WO CNTRST FLWD CNTRST: CPT | Mod: TC

## 2020-12-10 PROCEDURE — 74170 CT ABDOMEN W WO CONTRAST: ICD-10-PCS | Mod: 26,,, | Performed by: RADIOLOGY

## 2020-12-10 PROCEDURE — 99214 OFFICE O/P EST MOD 30 MIN: CPT | Mod: S$PBB,,, | Performed by: NURSE PRACTITIONER

## 2020-12-10 PROCEDURE — 99999 PR PBB SHADOW E&M-EST. PATIENT-LVL IV: CPT | Mod: PBBFAC,,, | Performed by: NURSE PRACTITIONER

## 2020-12-10 PROCEDURE — 74170 CT ABD WO CNTRST FLWD CNTRST: CPT | Mod: 26,,, | Performed by: RADIOLOGY

## 2020-12-10 PROCEDURE — 25500020 PHARM REV CODE 255: Performed by: NURSE PRACTITIONER

## 2020-12-10 PROCEDURE — 99214 OFFICE O/P EST MOD 30 MIN: CPT | Mod: PBBFAC,25 | Performed by: NURSE PRACTITIONER

## 2020-12-10 PROCEDURE — 99999 PR PBB SHADOW E&M-EST. PATIENT-LVL IV: ICD-10-PCS | Mod: PBBFAC,,, | Performed by: NURSE PRACTITIONER

## 2020-12-10 PROCEDURE — 99214 PR OFFICE/OUTPT VISIT, EST, LEVL IV, 30-39 MIN: ICD-10-PCS | Mod: S$PBB,,, | Performed by: NURSE PRACTITIONER

## 2020-12-10 RX ADMIN — IOHEXOL 100 ML: 350 INJECTION, SOLUTION INTRAVENOUS at 10:12

## 2020-12-10 NOTE — PROGRESS NOTES
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 Diagnoses of Secondary esophageal varices without bleeding and Renal lesion were also pertinent to this visit.    PCP: Zander Segovia       HPI:  This is a 70 y.o. male here for follow up of the above  Pt states that he has been feeling overall well without any recent decompensating events.   Recent labs are stable.   US showed no liver lesions or masses.  A new cystic lesion on the kidney seen. He is scheduled for a CT renal mass protocol today.   Denies any abdominal pain.  No nausea or vomiting.  No change in bowel pattern.  No melena or hematochezia. No weight loss.  No upper GI bleeding.  No ascites or BLE.  No overt confusion.  EV: EGD 1/2019 with Grade III EV.  On BB   HCC: US 12/2020 without lesion or mass    Review of Systems   Constitutional: Negative for chills, fever, malaise/fatigue and weight loss.   Respiratory: Negative for cough.    Cardiovascular: Negative for chest pain.   Gastrointestinal:        Per HPI   Musculoskeletal: Negative for myalgias.   Skin: Negative for itching and rash.   Neurological: Negative for headaches.   Psychiatric/Behavioral: The patient is not nervous/anxious.        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 No pertinent family history.    Social History:   Social History     Tobacco Use    Smoking status: Never Smoker    Smokeless tobacco: Never Used   Substance Use Topics    Alcohol use: Yes     Comment: socially     "Drug use: No       Allergies: Reviewed    Home Medications:  Current Outpatient Medications on File Prior to Visit   Medication Sig Dispense Refill    amLODIPine (NORVASC) 5 MG tablet       blood-glucose meter Misc AS DIRECTED  0    fish oil-omega-3 fatty acids 300-1,000 mg capsule Take 2 g by mouth once daily.      glimepiride (AMARYL) 1 MG tablet       loratadine (CLARITIN) 10 mg tablet Take 10 mg by mouth once daily.      losartan-hydrochlorothiazide 100-12.5 mg (HYZAAR) 100-12.5 mg Tab Take 1 tablet by mouth once daily.       meloxicam (MOBIC) 15 MG tablet       metFORMIN (GLUCOPHAGE) 500 MG tablet Take 500 mg by mouth 2 (two) times daily.       MILK THISTLE ORAL Take by mouth.      multivitamin (ONE DAILY MULTIVITAMIN) per tablet Take 1 tablet by mouth once daily.      olmesartan (BENICAR) 40 MG tablet       omeprazole (PRILOSEC) 10 MG capsule Take 10 mg by mouth once daily.      propranolol (INDERAL) 10 MG tablet TAKE ONE TABLET BY MOUTH TWICE A  tablet 1    psyllium husk (METAMUCIL ORAL) Take by mouth.      TRUE METRIX GLUCOSE TEST STRIP Strp USE TO Check blood glucose TWICE DAILY      TRUEPLUS LANCETS 28 gauge Misc USE TO Check blood glucose TWICE DAILY      hydroCHLOROthiazide (HYDRODIURIL) 12.5 MG Tab        No current facility-administered medications on file prior to visit.        Physical Exam:  Vital Signs:  BP (!) 142/74   Pulse 61   Ht 6' 2" (1.88 m)   Wt 105.5 kg (232 lb 9.4 oz)   BMI 29.86 kg/m²   Body mass index is 29.86 kg/m².  Physical Exam   Constitutional: He is oriented to person, place, and time. He appears well-developed.   HENT:   Head: Normocephalic.   Neck: Normal range of motion.   Cardiovascular: Normal rate and regular rhythm.   Pulmonary/Chest: Effort normal and breath sounds normal.   Abdominal: Soft. Bowel sounds are normal. He exhibits no distension. There is no abdominal tenderness.   Musculoskeletal: Normal range of motion.   Neurological: He is alert " and oriented to person, place, and time.   Skin: Skin is warm and dry.   Psychiatric: He has a normal mood and affect.   Vitals reviewed.      Labs: Pertinent labs reviewed.  MELD-Na score: 8 at 11/19/2020  8:49 AM  MELD score: 8 at 11/19/2020  8:49 AM  Calculated from:  Serum Creatinine: 0.8 mg/dL (Rounded to 1 mg/dL) at 11/19/2020  8:49 AM  Serum Sodium: 142 mmol/L (Rounded to 137 mmol/L) at 11/19/2020  8:49 AM  Total Bilirubin: 1.3 mg/dL at 11/19/2020  8:49 AM  INR(ratio): 1.1 at 11/19/2020  8:49 AM  Age: 70 years 3 months      Assessment:  1. 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 Secondary esophageal varices without bleeding    3. 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     Renal lesion  - unclear etiology  - continue with CT today.   - will refer to urology if needed based on results today.           Return to Clinic:    No follow-ups on file.

## 2020-12-17 ENCOUNTER — TELEPHONE (OUTPATIENT)
Dept: GASTROENTEROLOGY | Facility: CLINIC | Age: 70
End: 2020-12-17

## 2020-12-17 NOTE — TELEPHONE ENCOUNTER
Returned call and patient asked if he needed to see Urologist due to the cyst showing on his recent ultrasound. Spoke with Monique Vincent and she said patient would benefit from one visit but that It was not anything concerning. I informed patient to reach out to PCP and see if there was anyone closer to home that they recommended. He verbalized understanding.

## 2020-12-17 NOTE — TELEPHONE ENCOUNTER
----- Message from Zari Otoole sent at 12/17/2020 10:50 AM CST -----  Regarding: concerns  Good morning,      Pt would like to know if he needs to see an urologist due to results and can be  reached at [phone]      Thanks,  Zari Otoole

## 2021-04-16 ENCOUNTER — TELEPHONE (OUTPATIENT)
Dept: HEPATOLOGY | Facility: CLINIC | Age: 71
End: 2021-04-16

## 2021-06-09 ENCOUNTER — TELEPHONE (OUTPATIENT)
Dept: RADIOLOGY | Facility: HOSPITAL | Age: 71
End: 2021-06-09

## 2021-06-10 ENCOUNTER — HOSPITAL ENCOUNTER (OUTPATIENT)
Dept: RADIOLOGY | Facility: HOSPITAL | Age: 71
Discharge: HOME | End: 2021-06-10
Attending: NURSE PRACTITIONER
Payer: MEDICARE

## 2021-06-10 ENCOUNTER — OFFICE VISIT (OUTPATIENT)
Dept: HEPATOLOGY | Facility: CLINIC | Age: 71
End: 2021-06-10
Payer: MEDICARE

## 2021-06-10 VITALS
HEART RATE: 77 BPM | BODY MASS INDEX: 29.03 KG/M2 | DIASTOLIC BLOOD PRESSURE: 68 MMHG | HEIGHT: 74 IN | WEIGHT: 226.19 LBS | SYSTOLIC BLOOD PRESSURE: 124 MMHG

## 2021-06-10 DIAGNOSIS — K75.81 LIVER CIRRHOSIS SECONDARY TO NASH: ICD-10-CM

## 2021-06-10 DIAGNOSIS — K74.60 LIVER CIRRHOSIS SECONDARY TO NASH: Primary | ICD-10-CM

## 2021-06-10 DIAGNOSIS — K75.81 LIVER CIRRHOSIS SECONDARY TO NASH: Primary | ICD-10-CM

## 2021-06-10 DIAGNOSIS — I85.10 SECONDARY ESOPHAGEAL VARICES WITHOUT BLEEDING: ICD-10-CM

## 2021-06-10 DIAGNOSIS — K74.60 LIVER CIRRHOSIS SECONDARY TO NASH: ICD-10-CM

## 2021-06-10 PROCEDURE — 99214 OFFICE O/P EST MOD 30 MIN: CPT | Mod: S$PBB,,, | Performed by: NURSE PRACTITIONER

## 2021-06-10 PROCEDURE — 99999 PR PBB SHADOW E&M-EST. PATIENT-LVL IV: ICD-10-PCS | Mod: PBBFAC,,, | Performed by: NURSE PRACTITIONER

## 2021-06-10 PROCEDURE — 76700 US EXAM ABDOM COMPLETE: CPT | Mod: 26,,, | Performed by: RADIOLOGY

## 2021-06-10 PROCEDURE — 76700 US ABDOMEN COMPLETE: ICD-10-PCS | Mod: 26,,, | Performed by: RADIOLOGY

## 2021-06-10 PROCEDURE — 99214 PR OFFICE/OUTPT VISIT, EST, LEVL IV, 30-39 MIN: ICD-10-PCS | Mod: S$PBB,,, | Performed by: NURSE PRACTITIONER

## 2021-06-10 PROCEDURE — 99999 PR PBB SHADOW E&M-EST. PATIENT-LVL IV: CPT | Mod: PBBFAC,,, | Performed by: NURSE PRACTITIONER

## 2021-06-10 PROCEDURE — 99214 OFFICE O/P EST MOD 30 MIN: CPT | Mod: PBBFAC,25 | Performed by: NURSE PRACTITIONER

## 2021-06-10 PROCEDURE — 76700 US EXAM ABDOM COMPLETE: CPT | Mod: TC

## 2021-06-10 RX ORDER — OXYCODONE AND ACETAMINOPHEN 10; 325 MG/1; MG/1
TABLET ORAL
COMMUNITY
Start: 2021-05-21

## 2021-06-18 ENCOUNTER — PATIENT MESSAGE (OUTPATIENT)
Dept: HEPATOLOGY | Facility: CLINIC | Age: 71
End: 2021-06-18

## 2021-06-25 ENCOUNTER — TELEPHONE (OUTPATIENT)
Dept: HEPATOLOGY | Facility: CLINIC | Age: 71
End: 2021-06-25

## 2021-07-13 ENCOUNTER — PATIENT MESSAGE (OUTPATIENT)
Dept: HEPATOLOGY | Facility: CLINIC | Age: 71
End: 2021-07-13

## 2021-09-09 ENCOUNTER — HISTORICAL (OUTPATIENT)
Dept: LAB | Facility: HOSPITAL | Age: 71
End: 2021-09-09

## 2021-09-09 LAB
ABS NEUT (OLG): 3.02
ALBUMIN SERPL-MCNC: 4 GM/DL (ref 3.4–4.8)
ALBUMIN/GLOB SERPL: 1.1 RATIO (ref 1.1–2)
ALP SERPL-CCNC: 136 UNIT/L (ref 40–150)
ALT SERPL-CCNC: 52 UNIT/L (ref 0–55)
APPEARANCE, UA: CLEAR
AST SERPL-CCNC: 59 UNIT/L (ref 5–34)
BACTERIA #/AREA URNS AUTO: NORMAL /HPF
BASOPHILS # BLD AUTO: 0.02 X10(3)/MCL
BASOPHILS NFR BLD AUTO: 0.4 %
BILIRUB SERPL-MCNC: 1.1 MG/DL
BILIRUB UR QL STRIP: NEGATIVE
BILIRUBIN DIRECT+TOT PNL SERPL-MCNC: 0.4 MG/DL (ref 0–0.5)
BILIRUBIN DIRECT+TOT PNL SERPL-MCNC: 0.7 MG/DL
BUN SERPL-MCNC: 11 MG/DL (ref 8.4–25.7)
CALCIUM SERPL-MCNC: 9.4 MG/DL (ref 8.8–10)
CHLORIDE SERPL-SCNC: 107 MMOL/L (ref 98–107)
CHOLEST SERPL-MCNC: 168 MG/DL
CHOLEST/HDLC SERPL: 5 {RATIO} (ref 0–5)
CO2 SERPL-SCNC: 26 MEQ/L (ref 23–31)
COLOR UR: YELLOW
CREAT SERPL-MCNC: 0.77 MG/DL (ref 0.73–1.18)
CREAT UR-MCNC: 83 MG/DL (ref 58–161)
EOSINOPHIL # BLD AUTO: 0.18 X10(3)/MCL
EOSINOPHIL NFR BLD AUTO: 3.5 %
ERYTHROCYTE [DISTWIDTH] IN BLOOD BY AUTOMATED COUNT: 15 %
EST. AVERAGE GLUCOSE BLD GHB EST-MCNC: 108 MG/DL
GLOBULIN SER-MCNC: 3.6 GM/DL (ref 2.4–3.5)
GLUCOSE (UA): NEGATIVE
GLUCOSE SERPL-MCNC: 115 MG/DL (ref 82–115)
HBA1C MFR BLD: 5.4 % (ref 4–6)
HCT VFR BLD AUTO: 43.6 % (ref 39–49)
HDLC SERPL-MCNC: 34 MG/DL (ref 35–60)
HGB BLD-MCNC: 14.8 GM/DL (ref 12.6–16.6)
HGB UR QL STRIP: NORMAL
IMM GRANULOCYTES # BLD AUTO: 0.01 10*3/UL (ref 0–0.1)
IMM GRANULOCYTES NFR BLD AUTO: 0.2 % (ref 0–1)
KETONES UR QL STRIP: NEGATIVE
LDLC SERPL CALC-MCNC: 110 MG/DL (ref 50–140)
LEUKOCYTE ESTERASE UR QL STRIP: NORMAL
LYMPHOCYTES # BLD AUTO: 1.41 X10(3)/MCL
LYMPHOCYTES NFR BLD AUTO: 27.3 %
MCH RBC QN AUTO: 30.8 PG (ref 27–33)
MCHC RBC AUTO-ENTMCNC: 33.9 GM/DL (ref 32–35)
MCV RBC AUTO: 90.8 FL (ref 84–97)
MICROALBUMIN UR-MCNC: 71.7 UG/ML
MICROALBUMIN/CREAT RATIO PNL UR: 86.4 MG/GM CR (ref 0–30)
MONOCYTES # BLD AUTO: 0.52 X10(3)/MCL
MONOCYTES NFR BLD AUTO: 10.1 %
NEUTROPHILS # BLD AUTO: 3.02 X10(3)/MCL
NEUTROPHILS NFR BLD AUTO: 58.5 %
NITRITE UR QL STRIP.AUTO: NEGATIVE
PH UR STRIP: 7 [PH] (ref 4.6–8)
PLATELET # BLD AUTO: 95 X10(3)/MCL (ref 140–450)
PLATELET # BLD EST: ABNORMAL 10*3/UL
PMV BLD AUTO: 11 FL
POTASSIUM SERPL-SCNC: 4.2 MMOL/L (ref 3.5–5.1)
PROT SERPL-MCNC: 7.6 GM/DL (ref 5.8–7.6)
PROT UR QL STRIP: NEGATIVE
PSA SERPL-MCNC: 4.65 NG/ML
RBC # BLD AUTO: 4.8 X10(6)/MCL (ref 4.3–5.6)
RBC #/AREA URNS HPF: NORMAL /[HPF]
SODIUM SERPL-SCNC: 142 MMOL/L (ref 136–145)
SP GR UR STRIP: 1.01 (ref 1–1.03)
SQUAMOUS EPITHELIAL, UA: NORMAL
TRIGL SERPL-MCNC: 119 MG/DL (ref 34–140)
UROBILINOGEN UR STRIP-ACNC: 1
VLDLC SERPL CALC-MCNC: 24 MG/DL
WBC # SPEC AUTO: 5.16 X10(3)/MCL (ref 3.4–9.2)
WBC #/AREA URNS AUTO: NORMAL /HPF

## 2021-11-29 ENCOUNTER — TELEPHONE (OUTPATIENT)
Dept: HEPATOLOGY | Facility: CLINIC | Age: 71
End: 2021-11-29
Payer: MEDICARE

## 2021-11-29 ENCOUNTER — PATIENT MESSAGE (OUTPATIENT)
Dept: HEPATOLOGY | Facility: CLINIC | Age: 71
End: 2021-11-29
Payer: MEDICARE

## 2021-12-22 ENCOUNTER — TELEPHONE (OUTPATIENT)
Dept: HEPATOLOGY | Facility: CLINIC | Age: 71
End: 2021-12-22
Payer: MEDICARE

## 2021-12-23 ENCOUNTER — OFFICE VISIT (OUTPATIENT)
Dept: HEPATOLOGY | Facility: CLINIC | Age: 71
End: 2021-12-23
Payer: MEDICARE

## 2021-12-23 ENCOUNTER — HOSPITAL ENCOUNTER (OUTPATIENT)
Dept: RADIOLOGY | Facility: HOSPITAL | Age: 71
Discharge: HOME | End: 2021-12-23
Attending: NURSE PRACTITIONER
Payer: MEDICARE

## 2021-12-23 VITALS
SYSTOLIC BLOOD PRESSURE: 140 MMHG | RESPIRATION RATE: 20 BRPM | BODY MASS INDEX: 29.99 KG/M2 | HEIGHT: 74 IN | DIASTOLIC BLOOD PRESSURE: 70 MMHG | WEIGHT: 233.69 LBS | HEART RATE: 60 BPM

## 2021-12-23 DIAGNOSIS — K75.81 LIVER CIRRHOSIS SECONDARY TO NASH: Primary | ICD-10-CM

## 2021-12-23 DIAGNOSIS — K74.60 LIVER CIRRHOSIS SECONDARY TO NASH: ICD-10-CM

## 2021-12-23 DIAGNOSIS — I85.10 SECONDARY ESOPHAGEAL VARICES WITHOUT BLEEDING: ICD-10-CM

## 2021-12-23 DIAGNOSIS — K74.60 LIVER CIRRHOSIS SECONDARY TO NASH: Primary | ICD-10-CM

## 2021-12-23 DIAGNOSIS — K75.81 LIVER CIRRHOSIS SECONDARY TO NASH: ICD-10-CM

## 2021-12-23 PROCEDURE — 76700 US EXAM ABDOM COMPLETE: CPT | Mod: 26,,, | Performed by: RADIOLOGY

## 2021-12-23 PROCEDURE — 99999 PR PBB SHADOW E&M-EST. PATIENT-LVL IV: ICD-10-PCS | Mod: PBBFAC,,, | Performed by: NURSE PRACTITIONER

## 2021-12-23 PROCEDURE — 99214 OFFICE O/P EST MOD 30 MIN: CPT | Mod: S$PBB,,, | Performed by: NURSE PRACTITIONER

## 2021-12-23 PROCEDURE — 76700 US ABDOMEN COMPLETE: ICD-10-PCS | Mod: 26,,, | Performed by: RADIOLOGY

## 2021-12-23 PROCEDURE — 99214 PR OFFICE/OUTPT VISIT, EST, LEVL IV, 30-39 MIN: ICD-10-PCS | Mod: S$PBB,,, | Performed by: NURSE PRACTITIONER

## 2021-12-23 PROCEDURE — 99999 PR PBB SHADOW E&M-EST. PATIENT-LVL IV: CPT | Mod: PBBFAC,,, | Performed by: NURSE PRACTITIONER

## 2021-12-23 PROCEDURE — 99214 OFFICE O/P EST MOD 30 MIN: CPT | Mod: PBBFAC,25 | Performed by: NURSE PRACTITIONER

## 2021-12-23 PROCEDURE — 76700 US EXAM ABDOM COMPLETE: CPT | Mod: TC

## 2021-12-27 ENCOUNTER — TELEPHONE (OUTPATIENT)
Dept: GASTROENTEROLOGY | Facility: CLINIC | Age: 71
End: 2021-12-27
Payer: MEDICARE

## 2022-02-07 ENCOUNTER — TELEPHONE (OUTPATIENT)
Dept: HEPATOLOGY | Facility: CLINIC | Age: 72
End: 2022-02-07
Payer: MEDICARE

## 2022-02-07 NOTE — TELEPHONE ENCOUNTER
Returned patient's call. He was inquiring as to when he was initially diagnosed with ROMEO, after reviewing the patient's chart patient was informed that 4/7/2017 and voiced understanding.

## 2022-02-07 NOTE — TELEPHONE ENCOUNTER
----- Message from Eusebia Menchaca sent at 2/7/2022  2:00 PM CST -----  Contact: pt [phone]  Wants to know when was he diagnosed with non-alcoholic Liver cirrhosis    Please call and advise.    Thank You

## 2022-06-22 ENCOUNTER — TELEPHONE (OUTPATIENT)
Dept: GASTROENTEROLOGY | Facility: CLINIC | Age: 72
End: 2022-06-22
Payer: MEDICARE

## 2022-06-22 DIAGNOSIS — K75.81 LIVER CIRRHOSIS SECONDARY TO NASH: Primary | ICD-10-CM

## 2022-06-22 DIAGNOSIS — K74.60 LIVER CIRRHOSIS SECONDARY TO NASH: Primary | ICD-10-CM

## 2022-06-22 DIAGNOSIS — E11.9 TYPE 2 DIABETES MELLITUS WITHOUT COMPLICATION, WITHOUT LONG-TERM CURRENT USE OF INSULIN: ICD-10-CM

## 2022-06-22 NOTE — TELEPHONE ENCOUNTER
----- Message from Bess Ashby RN sent at 6/22/2022 10:10 AM CDT -----  Contact: self  Monique- are you ok with ordering an A1C for patient or should I direct him to his PCP? He has an appointment with you, along with labs and an ultrasound, scheduled for tomorrow.     Thanks, and have a great day-     Bess Ashby RN  Nurse Navigator, Hepatology  [facility]    ----- Message -----  From: Tripp Rutledge  Sent: 6/22/2022   9:24 AM CDT  To: Melisa BAILEY Staff    Phi Segovia Jr. Would like a call back at [phone], in regards to getting an A1c  lab order.

## 2022-06-22 NOTE — TELEPHONE ENCOUNTER
Attempted to return call to patient at [phone] with no answer. Left voicemail message for patient to make him aware of provider orders.     Bess Ashby, RN  Nurse Navigator, Hepatology  [facility]

## 2022-06-23 ENCOUNTER — OFFICE VISIT (OUTPATIENT)
Dept: HEPATOLOGY | Facility: CLINIC | Age: 72
End: 2022-06-23
Payer: MEDICARE

## 2022-06-23 ENCOUNTER — HOSPITAL ENCOUNTER (OUTPATIENT)
Dept: RADIOLOGY | Facility: HOSPITAL | Age: 72
Discharge: HOME | End: 2022-06-23
Attending: NURSE PRACTITIONER
Payer: MEDICARE

## 2022-06-23 VITALS
WEIGHT: 212.5 LBS | HEIGHT: 74 IN | SYSTOLIC BLOOD PRESSURE: 122 MMHG | HEART RATE: 64 BPM | DIASTOLIC BLOOD PRESSURE: 74 MMHG | BODY MASS INDEX: 27.27 KG/M2

## 2022-06-23 DIAGNOSIS — K75.81 LIVER CIRRHOSIS SECONDARY TO NASH: Primary | ICD-10-CM

## 2022-06-23 DIAGNOSIS — K74.60 LIVER CIRRHOSIS SECONDARY TO NASH: Primary | ICD-10-CM

## 2022-06-23 DIAGNOSIS — I85.10 SECONDARY ESOPHAGEAL VARICES WITHOUT BLEEDING: ICD-10-CM

## 2022-06-23 DIAGNOSIS — K75.81 LIVER CIRRHOSIS SECONDARY TO NASH: ICD-10-CM

## 2022-06-23 DIAGNOSIS — K74.60 LIVER CIRRHOSIS SECONDARY TO NASH: ICD-10-CM

## 2022-06-23 PROCEDURE — 99999 PR PBB SHADOW E&M-EST. PATIENT-LVL III: ICD-10-PCS | Mod: PBBFAC,,, | Performed by: NURSE PRACTITIONER

## 2022-06-23 PROCEDURE — 76705 ECHO EXAM OF ABDOMEN: CPT | Mod: 26,,, | Performed by: RADIOLOGY

## 2022-06-23 PROCEDURE — 76705 ECHO EXAM OF ABDOMEN: CPT | Mod: TC

## 2022-06-23 PROCEDURE — 76705 US ABDOMEN LIMITED: ICD-10-PCS | Mod: 26,,, | Performed by: RADIOLOGY

## 2022-06-23 PROCEDURE — 99213 OFFICE O/P EST LOW 20 MIN: CPT | Mod: PBBFAC,25 | Performed by: NURSE PRACTITIONER

## 2022-06-23 PROCEDURE — 99214 PR OFFICE/OUTPT VISIT, EST, LEVL IV, 30-39 MIN: ICD-10-PCS | Mod: S$PBB,,, | Performed by: NURSE PRACTITIONER

## 2022-06-23 PROCEDURE — 99214 OFFICE O/P EST MOD 30 MIN: CPT | Mod: S$PBB,,, | Performed by: NURSE PRACTITIONER

## 2022-06-23 PROCEDURE — 99999 PR PBB SHADOW E&M-EST. PATIENT-LVL III: CPT | Mod: PBBFAC,,, | Performed by: NURSE PRACTITIONER

## 2022-06-23 NOTE — PROGRESS NOTES
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 A diagnosis of Secondary esophageal varices without bleeding was also pertinent to this visit.    PCP: Zander Segovia       HPI:  This is a 71 y.o. male here for follow up of the above  Pt states that he has been feeling overall stable without any new complaints  Labs today are stable  US without lesion or mass   No upper GI bleeding.  No ascites or BLE.  No overt confusion.  Taking BB as prescribed, HR stable     Review of Systems   Constitutional: Negative for chills, fever, malaise/fatigue and weight loss.   Respiratory: Negative for cough.    Cardiovascular: Negative for chest pain.   Gastrointestinal:        Per HPI   Musculoskeletal: Negative for myalgias.   Skin: Negative for itching and rash.   Neurological: Negative for headaches.   Psychiatric/Behavioral: The patient is not nervous/anxious.        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;  Service: Endoscopy;  Laterality: N/A;    SHOULDER ARTHROSCOPY Right     2021       Family History:   No family history on file.    Social History:   Social History     Tobacco Use    Smoking status: Never Smoker    Smokeless tobacco: Never Used   Substance Use Topics    Alcohol use: Yes     Comment: socially    Drug use: No       Allergies: Reviewed    Home Medications:  Current Outpatient Medications on File Prior to Visit   Medication Sig Dispense Refill    amLODIPine (NORVASC) 5 MG tablet       blood-glucose meter Misc AS DIRECTED  0    fish oil-omega-3 fatty acids 300-1,000 mg capsule  "Take 2 g by mouth once daily.      glimepiride (AMARYL) 1 MG tablet       hydroCHLOROthiazide (HYDRODIURIL) 12.5 MG Tab       loratadine (CLARITIN) 10 mg tablet Take 10 mg by mouth once daily.      losartan-hydrochlorothiazide 100-12.5 mg (HYZAAR) 100-12.5 mg Tab Take 1 tablet by mouth once daily.       meloxicam (MOBIC) 15 MG tablet       metFORMIN (GLUCOPHAGE) 500 MG tablet Take 500 mg by mouth 2 (two) times daily.       MILK THISTLE ORAL Take by mouth.      multivitamin (THERAGRAN) per tablet Take 1 tablet by mouth once daily.      olmesartan (BENICAR) 40 MG tablet       omeprazole (PRILOSEC) 10 MG capsule Take 10 mg by mouth once daily.      oxyCODONE-acetaminophen (PERCOCET)  mg per tablet       propranolol (INDERAL) 10 MG tablet TAKE ONE TABLET BY MOUTH TWICE A  tablet 1    psyllium husk (METAMUCIL ORAL) Take by mouth.      TRUE METRIX GLUCOSE TEST STRIP Strp USE TO Check blood glucose TWICE DAILY      TRUEPLUS LANCETS 28 gauge Misc USE TO Check blood glucose TWICE DAILY       No current facility-administered medications on file prior to visit.       Physical Exam:  Vital Signs:  /74 (BP Location: Left arm, Patient Position: Sitting, BP Method: Medium (Manual))   Pulse 64   Ht 6' 2" (1.88 m)   Wt 96.4 kg (212 lb 8.4 oz)   BMI 27.29 kg/m²   Body mass index is 27.29 kg/m².  Physical Exam  Vitals reviewed.   Constitutional:       Appearance: He is well-developed.   HENT:      Head: Normocephalic.   Eyes:      General: No scleral icterus.  Cardiovascular:      Rate and Rhythm: Normal rate and regular rhythm.   Pulmonary:      Effort: Pulmonary effort is normal.      Breath sounds: Normal breath sounds.   Abdominal:      General: Bowel sounds are normal. There is no distension.      Palpations: Abdomen is soft.      Tenderness: There is no abdominal tenderness.   Musculoskeletal:         General: Normal range of motion.      Cervical back: Normal range of motion.   Skin:     " General: Skin is warm and dry.   Neurological:      Mental Status: He is alert and oriented to person, place, and time.         Labs: Pertinent labs reviewed.  MELD-Na score: 9 at 6/23/2022  7:02 AM  MELD score: 9 at 6/23/2022  7:02 AM  Calculated from:  Serum Creatinine: 0.8 mg/dL (Using min of 1 mg/dL) at 6/23/2022  7:02 AM  Serum Sodium: 144 mmol/L (Using max of 137 mmol/L) at 6/23/2022  7:02 AM  Total Bilirubin: 1.6 mg/dL at 6/23/2022  7:02 AM  INR(ratio): 1.1 at 6/23/2022  7:02 AM  Age: 71 years        Assessment:  1. 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           Return to Clinic:  6 months with pre-clinic labs and imaging.

## 2022-09-08 ENCOUNTER — LAB VISIT (OUTPATIENT)
Dept: LAB | Facility: HOSPITAL | Age: 72
End: 2022-09-08
Attending: FAMILY MEDICINE
Payer: MEDICARE

## 2022-09-08 DIAGNOSIS — Z79.899 ENCOUNTER FOR LONG-TERM (CURRENT) USE OF OTHER MEDICATIONS: ICD-10-CM

## 2022-09-08 DIAGNOSIS — E11.9 DIABETES MELLITUS WITHOUT COMPLICATION: ICD-10-CM

## 2022-09-08 DIAGNOSIS — Z12.5 SPECIAL SCREENING FOR MALIGNANT NEOPLASM OF PROSTATE: ICD-10-CM

## 2022-09-08 DIAGNOSIS — Z00.00 ROUTINE GENERAL MEDICAL EXAMINATION AT A HEALTH CARE FACILITY: Primary | ICD-10-CM

## 2022-09-08 DIAGNOSIS — I10 HYPERTENSION, UNSPECIFIED TYPE: ICD-10-CM

## 2022-09-08 LAB
ALBUMIN SERPL-MCNC: 4.1 GM/DL (ref 3.4–4.8)
ALBUMIN/GLOB SERPL: 1.2 RATIO (ref 1.1–2)
ALP SERPL-CCNC: 115 UNIT/L (ref 40–150)
ALT SERPL-CCNC: 61 UNIT/L (ref 0–55)
APPEARANCE UR: CLEAR
AST SERPL-CCNC: 59 UNIT/L (ref 5–34)
BACTERIA #/AREA URNS AUTO: NORMAL /HPF
BASOPHILS # BLD AUTO: 0.04 X10(3)/MCL (ref 0–0.2)
BASOPHILS NFR BLD AUTO: 0.7 %
BILIRUB UR QL STRIP.AUTO: NEGATIVE MG/DL
BILIRUBIN DIRECT+TOT PNL SERPL-MCNC: 1.7 MG/DL
BUN SERPL-MCNC: 12 MG/DL (ref 8.4–25.7)
CALCIUM SERPL-MCNC: 9.9 MG/DL (ref 8.8–10)
CHLORIDE SERPL-SCNC: 109 MMOL/L (ref 98–107)
CHOLEST SERPL-MCNC: 170 MG/DL
CHOLEST/HDLC SERPL: 4 {RATIO} (ref 0–5)
CO2 SERPL-SCNC: 25 MMOL/L (ref 23–31)
COLOR UR AUTO: YELLOW
CREAT SERPL-MCNC: 0.73 MG/DL (ref 0.73–1.18)
CREAT UR-MCNC: 65.2 MG/DL (ref 63–166)
EOSINOPHIL # BLD AUTO: 0.15 X10(3)/MCL (ref 0–0.9)
EOSINOPHIL NFR BLD AUTO: 2.6 %
ERYTHROCYTE [DISTWIDTH] IN BLOOD BY AUTOMATED COUNT: 14 % (ref 11.5–17)
EST. AVERAGE GLUCOSE BLD GHB EST-MCNC: 102.5 MG/DL
GFR SERPLBLD CREATININE-BSD FMLA CKD-EPI: >60 MLS/MIN/1.73/M2
GLOBULIN SER-MCNC: 3.4 GM/DL (ref 2.4–3.5)
GLUCOSE SERPL-MCNC: 112 MG/DL (ref 82–115)
GLUCOSE UR QL STRIP.AUTO: NEGATIVE MG/DL
HBA1C MFR BLD: 5.2 %
HCT VFR BLD AUTO: 41.3 % (ref 42–52)
HDLC SERPL-MCNC: 39 MG/DL (ref 35–60)
HGB BLD-MCNC: 15.1 GM/DL (ref 14–18)
IMM GRANULOCYTES # BLD AUTO: 0.01 X10(3)/MCL (ref 0–0.04)
IMM GRANULOCYTES NFR BLD AUTO: 0.2 %
KETONES UR QL STRIP.AUTO: NEGATIVE MG/DL
LDLC SERPL CALC-MCNC: 104 MG/DL (ref 50–140)
LEUKOCYTE ESTERASE UR QL STRIP.AUTO: NEGATIVE UNIT/L
LYMPHOCYTES # BLD AUTO: 1.53 X10(3)/MCL (ref 0.6–4.6)
LYMPHOCYTES NFR BLD AUTO: 26.9 %
MCH RBC QN AUTO: 32.8 PG (ref 27–31)
MCHC RBC AUTO-ENTMCNC: 36.6 MG/DL (ref 33–36)
MCV RBC AUTO: 89.6 FL (ref 80–94)
MICROALBUMIN UR-MCNC: 36.3 UG/ML
MICROALBUMIN/CREAT RATIO PNL UR: 55.7 MG/GM CR (ref 0–30)
MONOCYTES # BLD AUTO: 0.56 X10(3)/MCL (ref 0.1–1.3)
MONOCYTES NFR BLD AUTO: 9.9 %
NEUTROPHILS # BLD AUTO: 3.4 X10(3)/MCL (ref 2.1–9.2)
NEUTROPHILS NFR BLD AUTO: 59.7 %
NITRITE UR QL STRIP.AUTO: NEGATIVE
NRBC BLD AUTO-RTO: 0 %
PH UR STRIP.AUTO: 7 [PH]
PLATELET # BLD AUTO: 103 X10(3)/MCL (ref 130–400)
PMV BLD AUTO: 10.3 FL (ref 7.4–10.4)
POTASSIUM SERPL-SCNC: 3.6 MMOL/L (ref 3.5–5.1)
PROT SERPL-MCNC: 7.5 GM/DL (ref 5.8–7.6)
PROT UR QL STRIP.AUTO: NEGATIVE MG/DL
PSA SERPL-MCNC: 2.68 NG/ML
RBC # BLD AUTO: 4.61 X10(6)/MCL (ref 4.7–6.1)
RBC #/AREA URNS AUTO: NORMAL /HPF
RBC UR QL AUTO: ABNORMAL UNIT/L
SODIUM SERPL-SCNC: 143 MMOL/L (ref 136–145)
SP GR UR STRIP.AUTO: 1.01
SQUAMOUS #/AREA URNS AUTO: NORMAL /HPF
TRIGL SERPL-MCNC: 136 MG/DL (ref 34–140)
UROBILINOGEN UR STRIP-ACNC: 0.2 MG/DL
VLDLC SERPL CALC-MCNC: 27 MG/DL
WBC # SPEC AUTO: 5.7 X10(3)/MCL (ref 4.5–11.5)
WBC #/AREA URNS AUTO: NORMAL /HPF

## 2022-09-08 PROCEDURE — 85025 COMPLETE CBC W/AUTO DIFF WBC: CPT

## 2022-09-08 PROCEDURE — 84153 ASSAY OF PSA TOTAL: CPT

## 2022-09-08 PROCEDURE — 81001 URINALYSIS AUTO W/SCOPE: CPT

## 2022-09-08 PROCEDURE — 83036 HEMOGLOBIN GLYCOSYLATED A1C: CPT

## 2022-09-08 PROCEDURE — 80053 COMPREHEN METABOLIC PANEL: CPT

## 2022-09-08 PROCEDURE — 36415 COLL VENOUS BLD VENIPUNCTURE: CPT

## 2022-09-08 PROCEDURE — 80061 LIPID PANEL: CPT

## 2022-09-08 PROCEDURE — 82043 UR ALBUMIN QUANTITATIVE: CPT

## 2022-12-22 ENCOUNTER — HOSPITAL ENCOUNTER (OUTPATIENT)
Dept: RADIOLOGY | Facility: HOSPITAL | Age: 72
Discharge: HOME | End: 2022-12-22
Attending: NURSE PRACTITIONER
Payer: MEDICARE

## 2022-12-22 ENCOUNTER — OFFICE VISIT (OUTPATIENT)
Dept: HEPATOLOGY | Facility: CLINIC | Age: 72
End: 2022-12-22
Payer: MEDICARE

## 2022-12-22 VITALS
SYSTOLIC BLOOD PRESSURE: 140 MMHG | WEIGHT: 220 LBS | BODY MASS INDEX: 28.23 KG/M2 | DIASTOLIC BLOOD PRESSURE: 76 MMHG | HEIGHT: 74 IN | HEART RATE: 67 BPM

## 2022-12-22 DIAGNOSIS — K74.60 LIVER CIRRHOSIS SECONDARY TO NASH: Primary | ICD-10-CM

## 2022-12-22 DIAGNOSIS — K74.60 LIVER CIRRHOSIS SECONDARY TO NASH: ICD-10-CM

## 2022-12-22 DIAGNOSIS — K75.81 LIVER CIRRHOSIS SECONDARY TO NASH: Primary | ICD-10-CM

## 2022-12-22 DIAGNOSIS — K75.81 LIVER CIRRHOSIS SECONDARY TO NASH: ICD-10-CM

## 2022-12-22 DIAGNOSIS — I85.10 SECONDARY ESOPHAGEAL VARICES WITHOUT BLEEDING: ICD-10-CM

## 2022-12-22 PROCEDURE — 99999 PR PBB SHADOW E&M-EST. PATIENT-LVL IV: CPT | Mod: PBBFAC,,, | Performed by: NURSE PRACTITIONER

## 2022-12-22 PROCEDURE — 76705 ECHO EXAM OF ABDOMEN: CPT | Mod: 26,,, | Performed by: RADIOLOGY

## 2022-12-22 PROCEDURE — 99214 PR OFFICE/OUTPT VISIT, EST, LEVL IV, 30-39 MIN: ICD-10-PCS | Mod: S$PBB,,, | Performed by: NURSE PRACTITIONER

## 2022-12-22 PROCEDURE — 99999 PR PBB SHADOW E&M-EST. PATIENT-LVL IV: ICD-10-PCS | Mod: PBBFAC,,, | Performed by: NURSE PRACTITIONER

## 2022-12-22 PROCEDURE — 99214 OFFICE O/P EST MOD 30 MIN: CPT | Mod: PBBFAC,25 | Performed by: NURSE PRACTITIONER

## 2022-12-22 PROCEDURE — 76705 US ABDOMEN LIMITED: ICD-10-PCS | Mod: 26,,, | Performed by: RADIOLOGY

## 2022-12-22 PROCEDURE — 99214 OFFICE O/P EST MOD 30 MIN: CPT | Mod: S$PBB,,, | Performed by: NURSE PRACTITIONER

## 2022-12-22 PROCEDURE — 76705 ECHO EXAM OF ABDOMEN: CPT | Mod: TC

## 2022-12-22 RX ORDER — LATANOPROST 50 UG/ML
1 SOLUTION/ DROPS OPHTHALMIC NIGHTLY
COMMUNITY
Start: 2022-10-29

## 2022-12-22 NOTE — PROGRESS NOTES
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 A diagnosis of Secondary esophageal varices without bleeding was also pertinent to this visit.    PCP: Zander Segovia       HPI:  This is a 72 y.o. male here for follow up of the above  Pt states that he has been feeling overall well without any new complaints  Labs stable. US today is pending.   No upper GI bleeding.  No ascites or BLE.  No overt confusion.      Review of Systems   Constitutional:  Negative for chills, fever, malaise/fatigue and weight loss.   Respiratory:  Negative for cough.    Cardiovascular:  Negative for chest pain.   Gastrointestinal:         Per HPI   Musculoskeletal:  Negative for myalgias.   Skin:  Negative for itching and rash.   Neurological:  Negative for headaches.   Psychiatric/Behavioral:  The patient is not nervous/anxious.      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    Social History:   Social History     Tobacco Use    Smoking status: Never    Smokeless tobacco: Never   Substance Use Topics    Alcohol use: Yes     Comment: socially    Drug use: No       Allergies: Reviewed    Home Medications:  Current Outpatient Medications on File Prior to Visit   Medication Sig Dispense Refill    amLODIPine (NORVASC) 5 MG tablet       blood-glucose meter Misc AS DIRECTED  0    fish oil-omega-3 fatty acids 300-1,000 mg capsule Take 2 g by mouth once daily.      glimepiride (AMARYL) 1 MG tablet        "hydroCHLOROthiazide (HYDRODIURIL) 12.5 MG Tab       loratadine (CLARITIN) 10 mg tablet Take 10 mg by mouth once daily.      losartan-hydrochlorothiazide 100-12.5 mg (HYZAAR) 100-12.5 mg Tab Take 1 tablet by mouth once daily.       meloxicam (MOBIC) 15 MG tablet       metFORMIN (GLUCOPHAGE) 500 MG tablet Take 500 mg by mouth 2 (two) times daily.       MILK THISTLE ORAL Take by mouth.      multivitamin (THERAGRAN) per tablet Take 1 tablet by mouth once daily.      olmesartan (BENICAR) 40 MG tablet       omeprazole (PRILOSEC) 10 MG capsule Take 10 mg by mouth once daily.      propranolol (INDERAL) 10 MG tablet TAKE ONE TABLET BY MOUTH TWICE A  tablet 1    psyllium husk (METAMUCIL ORAL) Take by mouth.      TRUE METRIX GLUCOSE TEST STRIP Strp USE TO Check blood glucose TWICE DAILY      TRUEPLUS LANCETS 28 gauge Misc USE TO Check blood glucose TWICE DAILY      latanoprost 0.005 % ophthalmic solution Place 1 drop into the right eye every evening.      oxyCODONE-acetaminophen (PERCOCET)  mg per tablet        No current facility-administered medications on file prior to visit.       Physical Exam:  Vital Signs:  BP (!) 140/76 (BP Location: Right arm, Patient Position: Sitting, BP Method: Medium (Manual))   Pulse 67   Ht 6' 2" (1.88 m)   Wt 99.8 kg (220 lb 0.3 oz)   BMI 28.25 kg/m²   Body mass index is 28.25 kg/m².  Physical Exam  Vitals reviewed.   Constitutional:       Appearance: He is well-developed.   HENT:      Head: Normocephalic.   Eyes:      General: No scleral icterus.  Cardiovascular:      Rate and Rhythm: Normal rate.   Pulmonary:      Effort: Pulmonary effort is normal.   Abdominal:      General: There is no distension.      Tenderness: There is no abdominal tenderness.   Musculoskeletal:         General: Normal range of motion.      Cervical back: Normal range of motion.   Skin:     General: Skin is warm and dry.   Neurological:      Mental Status: He is alert and oriented to person, place, and " time.       Labs: Pertinent labs reviewed.  MELD-Na score: 10 at 12/22/2022  7:36 AM  MELD score: 10 at 12/22/2022  7:36 AM  Calculated from:  Serum Creatinine: 0.8 mg/dL (Using min of 1 mg/dL) at 12/22/2022  7:36 AM  Serum Sodium: 143 mmol/L (Using max of 137 mmol/L) at 12/22/2022  7:36 AM  Total Bilirubin: 1.4 mg/dL at 12/22/2022  7:36 AM  INR(ratio): 1.2 at 12/22/2022  7:36 AM  Age: 72 years  EV: On BB  HCC: US today pending       Assessment:  1. 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 Secondary esophageal varices without bleeding        Recommendations:  Stable without any new complaints or decompensating events  - continue with MELD labs and HCC screening every 6 months  - continue BB       Return to Clinic:  6 months with pre-clinic labs and imaging.

## 2022-12-27 ENCOUNTER — PATIENT MESSAGE (OUTPATIENT)
Dept: HEPATOLOGY | Facility: CLINIC | Age: 72
End: 2022-12-27
Payer: MEDICARE

## 2022-12-27 ENCOUNTER — TELEPHONE (OUTPATIENT)
Dept: HEPATOLOGY | Facility: CLINIC | Age: 72
End: 2022-12-27
Payer: MEDICARE

## 2022-12-27 NOTE — TELEPHONE ENCOUNTER
Patient phoned in reference to his labs. Patient was informed of his MELD score to which he voiced understanding. Patient also inquired about special diet for ROMEO. I then sent patient diet information via patient portal while still on the phone with the patient.

## 2022-12-27 NOTE — TELEPHONE ENCOUNTER
----- Message from Mary Ellen Maravilla sent at 12/27/2022 11:42 AM CST -----  Contact: Phi  .Type:  Patient Returning Call    Who Called:Phi   Who Left Message for Patient:nurse   Does the patient know what this is regarding?:results   Would the patient rather a call back or a response via [facility]? Call   Best Call Back Number:.[phone]   Additional Information: Patient is calling to get results.

## 2023-02-02 ENCOUNTER — TELEPHONE (OUTPATIENT)
Dept: HEPATOLOGY | Facility: CLINIC | Age: 73
End: 2023-02-02
Payer: MEDICARE

## 2023-02-02 NOTE — TELEPHONE ENCOUNTER
Spoke with patient on [phone] in reference to our clinic accepting [employer]. Patient was advised that we do accept that insurance. Patient states he was just informing us of his insurance change.     ----- Message from Anurag Harmon sent at 2/2/2023 12:52 PM CST -----  Contact: [phone]  Pt is calling in regards to his insurance. Pt stated that he has switched over to [employer] and would like to know does provider accepts that plan. Please call pt back at [phone]. Thanks KB

## 2023-02-10 ENCOUNTER — TELEPHONE (OUTPATIENT)
Dept: HEPATOLOGY | Facility: CLINIC | Age: 73
End: 2023-02-10
Payer: MEDICARE

## 2023-02-10 NOTE — TELEPHONE ENCOUNTER
----- Message from Caterina Carter sent at 2/10/2023  2:42 PM CST -----  Contact: Patient, [phone]  Patient is returning a phone call.  Who left a message for the patient: Bess  Does patient know what this is regarding:  Copy of Mediterranean diet  Would you like a call back, or a response through your [facility] portal?:   Call back  Comments:  Missed your call, please call him back. Thanks.

## 2023-02-10 NOTE — TELEPHONE ENCOUNTER
Attempted to return call to [phone] with no answer. Left voicemail message for patient to return call.

## 2023-02-10 NOTE — TELEPHONE ENCOUNTER
----- Message from Juany De La Garza sent at 2/10/2023 10:15 AM CST -----  Pt would like the nurse to call him back regarding a copy of a diet. Call back number is [phone]. Thx EL

## 2023-06-22 ENCOUNTER — HOSPITAL ENCOUNTER (OUTPATIENT)
Dept: RADIOLOGY | Facility: HOSPITAL | Age: 73
Discharge: HOME | End: 2023-06-22
Attending: NURSE PRACTITIONER
Payer: MEDICARE

## 2023-06-22 ENCOUNTER — TELEPHONE (OUTPATIENT)
Dept: GASTROENTEROLOGY | Facility: CLINIC | Age: 73
End: 2023-06-22
Payer: MEDICARE

## 2023-06-22 DIAGNOSIS — K75.81 LIVER CIRRHOSIS SECONDARY TO NASH: ICD-10-CM

## 2023-06-22 DIAGNOSIS — I85.10 SECONDARY ESOPHAGEAL VARICES WITHOUT BLEEDING: ICD-10-CM

## 2023-06-22 DIAGNOSIS — K74.60 LIVER CIRRHOSIS SECONDARY TO NASH: ICD-10-CM

## 2023-06-22 PROCEDURE — 76705 ECHO EXAM OF ABDOMEN: CPT | Mod: TC

## 2023-06-22 PROCEDURE — 76705 US ABDOMEN LIMITED: ICD-10-PCS | Mod: 26,,, | Performed by: RADIOLOGY

## 2023-06-22 PROCEDURE — 76705 ECHO EXAM OF ABDOMEN: CPT | Mod: 26,,, | Performed by: RADIOLOGY

## 2023-07-03 ENCOUNTER — OFFICE VISIT (OUTPATIENT)
Dept: HEPATOLOGY | Facility: CLINIC | Age: 73
End: 2023-07-03
Payer: MEDICARE

## 2023-07-03 VITALS — WEIGHT: 208 LBS | BODY MASS INDEX: 26.69 KG/M2 | HEIGHT: 74 IN

## 2023-07-03 DIAGNOSIS — I85.10 SECONDARY ESOPHAGEAL VARICES WITHOUT BLEEDING: ICD-10-CM

## 2023-07-03 DIAGNOSIS — K75.81 LIVER CIRRHOSIS SECONDARY TO NASH: Primary | ICD-10-CM

## 2023-07-03 DIAGNOSIS — K74.60 LIVER CIRRHOSIS SECONDARY TO NASH: Primary | ICD-10-CM

## 2023-07-03 PROCEDURE — 99214 PR OFFICE/OUTPT VISIT, EST, LEVL IV, 30-39 MIN: ICD-10-PCS | Mod: 95,,, | Performed by: NURSE PRACTITIONER

## 2023-07-03 PROCEDURE — 1159F PR MEDICATION LIST DOCUMENTED IN MEDICAL RECORD: ICD-10-PCS | Mod: CPTII,95,, | Performed by: NURSE PRACTITIONER

## 2023-07-03 PROCEDURE — 3288F FALL RISK ASSESSMENT DOCD: CPT | Mod: CPTII,95,, | Performed by: NURSE PRACTITIONER

## 2023-07-03 PROCEDURE — 4010F ACE/ARB THERAPY RXD/TAKEN: CPT | Mod: CPTII,95,, | Performed by: NURSE PRACTITIONER

## 2023-07-03 PROCEDURE — 1101F PT FALLS ASSESS-DOCD LE1/YR: CPT | Mod: CPTII,95,, | Performed by: NURSE PRACTITIONER

## 2023-07-03 PROCEDURE — 4010F PR ACE/ARB THEARPY RXD/TAKEN: ICD-10-PCS | Mod: CPTII,95,, | Performed by: NURSE PRACTITIONER

## 2023-07-03 PROCEDURE — 3288F PR FALLS RISK ASSESSMENT DOCUMENTED: ICD-10-PCS | Mod: CPTII,95,, | Performed by: NURSE PRACTITIONER

## 2023-07-03 PROCEDURE — 1126F AMNT PAIN NOTED NONE PRSNT: CPT | Mod: CPTII,95,, | Performed by: NURSE PRACTITIONER

## 2023-07-03 PROCEDURE — 1159F MED LIST DOCD IN RCRD: CPT | Mod: CPTII,95,, | Performed by: NURSE PRACTITIONER

## 2023-07-03 PROCEDURE — 3008F PR BODY MASS INDEX (BMI) DOCUMENTED: ICD-10-PCS | Mod: CPTII,95,, | Performed by: NURSE PRACTITIONER

## 2023-07-03 PROCEDURE — 99214 OFFICE O/P EST MOD 30 MIN: CPT | Mod: 95,,, | Performed by: NURSE PRACTITIONER

## 2023-07-03 PROCEDURE — 1126F PR PAIN SEVERITY QUANTIFIED, NO PAIN PRESENT: ICD-10-PCS | Mod: CPTII,95,, | Performed by: NURSE PRACTITIONER

## 2023-07-03 PROCEDURE — 3008F BODY MASS INDEX DOCD: CPT | Mod: CPTII,95,, | Performed by: NURSE PRACTITIONER

## 2023-07-03 PROCEDURE — 1101F PR PT FALLS ASSESS DOC 0-1 FALLS W/OUT INJ PAST YR: ICD-10-PCS | Mod: CPTII,95,, | Performed by: NURSE PRACTITIONER

## 2023-07-03 NOTE — PROGRESS NOTES
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 A diagnosis of Secondary esophageal varices without bleeding was also pertinent to this visit.    PCP: Claude H Meeks     The patient location is: [place]  The chief complaint leading to consultation is: above    Visit type: audiovisual    Face to Face time with patient: 15 minutes  20 minutes of total time spent on the encounter, which includes face to face time and non-face to face time preparing to see the patient (eg, review of tests), Obtaining and/or reviewing separately obtained history, Documenting clinical information in the electronic or other health record, Independently interpreting results (not separately reported) and communicating results to the patient/family/caregiver, or Care coordination (not separately reported).         Each patient to whom he or she provides medical services by telemedicine is:  (1) informed of the relationship between the physician and patient and the respective role of any other health care provider with respect to management of the patient; and (2) notified that he or she may decline to receive medical services by telemedicine and may withdraw from such care at any time.    Notes:     HPI:  This is a 72 y.o. male here for follow up of the above  Pt states that he has been feeling overall well without any new complaints.   Recent labs and US stable  No upper GI bleeding.  No ascites or BLE.  No overt confusion.  Denies any abdominal pain.  No nausea or vomiting.  No change in bowel pattern.  No melena or hematochezia. No weight loss.      Review of Systems   Constitutional:  Negative for chills, fever, malaise/fatigue and weight loss.   Respiratory:  Negative for cough.    Cardiovascular:  Negative for chest pain.   Gastrointestinal:         Per HPI   Musculoskeletal:  Negative for myalgias.   Skin:  Negative for  itching and rash.   Neurological:  Negative for headaches.   Psychiatric/Behavioral:  The patient is not nervous/anxious.      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    Social History:   Social History     Tobacco Use    Smoking status: Never    Smokeless tobacco: Never   Substance Use Topics    Alcohol use: Yes     Comment: socially    Drug use: No       Allergies: Reviewed    Home Medications:  Current Outpatient Medications on File Prior to Visit   Medication Sig Dispense Refill    amLODIPine (NORVASC) 5 MG tablet       blood-glucose meter Misc AS DIRECTED  0    fish oil-omega-3 fatty acids 300-1,000 mg capsule Take 2 g by mouth once daily.      glimepiride (AMARYL) 1 MG tablet       hydroCHLOROthiazide (HYDRODIURIL) 12.5 MG Tab       latanoprost 0.005 % ophthalmic solution Place 1 drop into the right eye every evening.      loratadine (CLARITIN) 10 mg tablet Take 10 mg by mouth once daily.      losartan-hydrochlorothiazide 100-12.5 mg (HYZAAR) 100-12.5 mg Tab Take 1 tablet by mouth once daily.       meloxicam (MOBIC) 15 MG tablet       metFORMIN (GLUCOPHAGE) 500 MG tablet Take 500 mg by mouth 2 (two) times daily.       MILK THISTLE ORAL Take by mouth.      multivitamin (THERAGRAN) per tablet Take 1 tablet by mouth once daily.      olmesartan (BENICAR) 40 MG tablet       omeprazole (PRILOSEC) 10 MG capsule Take 10 mg by mouth once daily.      oxyCODONE-acetaminophen (PERCOCET)  mg per tablet       propranolol (INDERAL) 10 MG tablet TAKE ONE TABLET BY MOUTH TWICE A DAY  "180 tablet 1    psyllium husk (METAMUCIL ORAL) Take by mouth.      TRUE METRIX GLUCOSE TEST STRIP Strp USE TO Check blood glucose TWICE DAILY      TRUEPLUS LANCETS 28 gauge Misc USE TO Check blood glucose TWICE DAILY       No current facility-administered medications on file prior to visit.       Physical Exam:  Vital Signs:  Ht 6' 2" (1.88 m)   Wt 94.3 kg (208 lb)   BMI 26.71 kg/m²   Body mass index is 26.71 kg/m².  Physical Exam  Constitutional:       Appearance: He is well-developed.   HENT:      Head: Normocephalic.   Eyes:      General: No scleral icterus.  Pulmonary:      Effort: Pulmonary effort is normal.   Musculoskeletal:         General: Normal range of motion.      Cervical back: Normal range of motion.   Skin:     General: Skin is dry.   Neurological:      Mental Status: He is alert.       Labs: Pertinent labs reviewed.  MELD-Na: 11 at 6/22/2023 10:20 AM  MELD: 11 at 6/22/2023 10:20 AM  Calculated from:  Serum Creatinine: 0.8 mg/dL (Using min of 1 mg/dL) at 6/22/2023 10:20 AM  Serum Sodium: 139 mmol/L (Using max of 137 mmol/L) at 6/22/2023 10:20 AM  Total Bilirubin: 1.9 mg/dL at 6/22/2023 10:20 AM  INR(ratio): 1.2 at 6/22/2023 10:20 AM  EV: On BB  HCC: US 6/23 without lesion or mass       Assessment:  1. 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 Secondary esophageal varices without bleeding        Recommendations:  Stable without any new complaints or decompensating events  - continue current medication  - continue with MELD labs and HCC screening every 6 months     Return to Clinic:    6 months with pre-clinic labs and imaging.       "

## 2023-11-16 ENCOUNTER — TELEPHONE (OUTPATIENT)
Dept: HEPATOLOGY | Facility: CLINIC | Age: 73
End: 2023-11-16
Payer: MEDICARE

## 2023-11-16 NOTE — TELEPHONE ENCOUNTER
----- Message from Sonia Jose G sent at 11/16/2023 10:56 AM CST -----  Contact: Phi  Type:  Patient Returning Call    Who Called:Phi  Who Left Message for Patient:unknown  Does the patient know what this is regarding?:unknown  Would the patient rather a call back or a response via [facility]? call  Best Call Back Number:[phone]   Additional Information: Patient reports missing a call and request another call back.   Thank you,  GH

## 2023-11-16 NOTE — TELEPHONE ENCOUNTER
----- Message from Kike Watts sent at 11/16/2023 10:09 AM CST -----  Contact: [phone]  Pt is calling regarding 6mnth f/u / please call back at [phone] . P[place]codj

## 2023-11-16 NOTE — TELEPHONE ENCOUNTER
----- Message from Bonnie Null sent at 11/16/2023 12:14 PM CST -----  Contact: Phi zaidi  [phone]  Patient is returning a phone call.  Who left a message for the patient: Bess  Does patient know what this is regarding:    Would you like a call back, or a response through your [facility] portal?:  call back  Comments: Pt was returning the nurses call

## 2023-12-19 ENCOUNTER — TELEPHONE (OUTPATIENT)
Dept: HEPATOLOGY | Facility: CLINIC | Age: 73
End: 2023-12-19
Payer: MEDICARE

## 2023-12-19 NOTE — TELEPHONE ENCOUNTER
Spoke with patient on [phone] and scheduled appointments accordingly. Patient voices understanding.       ----- Message from Radha Perez sent at 12/19/2023  9:54 AM CST -----  Who Called: Pt    What is the request in detail: Requesting call back to discuss scheduling blood work and US at [facility]. He also needs his 6 month follow up scheduled virtually. Please advise    Can the clinic reply by [facility]?     Best Call Back Number: [phone]      Additional Information:

## 2023-12-21 ENCOUNTER — PATIENT MESSAGE (OUTPATIENT)
Dept: HEPATOLOGY | Facility: CLINIC | Age: 73
End: 2023-12-21
Payer: MEDICARE

## 2023-12-21 ENCOUNTER — HOSPITAL ENCOUNTER (OUTPATIENT)
Dept: RADIOLOGY | Facility: HOSPITAL | Age: 73
Discharge: HOME | End: 2023-12-21
Attending: NURSE PRACTITIONER
Payer: MEDICARE

## 2023-12-21 ENCOUNTER — TELEPHONE (OUTPATIENT)
Dept: HEPATOLOGY | Facility: CLINIC | Age: 73
End: 2023-12-21
Payer: MEDICARE

## 2023-12-21 DIAGNOSIS — K75.81 LIVER CIRRHOSIS SECONDARY TO NASH: ICD-10-CM

## 2023-12-21 DIAGNOSIS — K74.60 LIVER CIRRHOSIS SECONDARY TO NASH: ICD-10-CM

## 2023-12-21 PROCEDURE — 76705 ECHO EXAM OF ABDOMEN: CPT | Mod: TC

## 2023-12-21 NOTE — TELEPHONE ENCOUNTER
Returned patient's call, he simply wanted to make sure that the results of the ultrasound were stable and if anything else was needed. I informed the patient that nothing else is needed at this time to continue plan as originally discussed.

## 2023-12-21 NOTE — TELEPHONE ENCOUNTER
Spoke with patient on [phone], in reference to appointments. Patient voices understanding.        ----- Message from Kike Watts sent at 12/21/2023  7:51 AM CST -----  Contact: [phone]  Pt is calling to speak with nurse regarding lab/appt  . Please call back at [phone] . Emily

## 2023-12-21 NOTE — TELEPHONE ENCOUNTER
----- Message from Cyndee Whitlock sent at 12/21/2023  4:23 PM CST -----  Contact: Phi  .Type:  Patient Returning Call    Who Called:Phi  Who Left Message for Patient:nurse  Does the patient know what this is regarding?:yes  Would the patient rather a call back or a response via [facility]? Call back  Best Call Back Number:.[phone]    Additional Information: na      Thanks  ALLISON

## 2023-12-27 ENCOUNTER — OFFICE VISIT (OUTPATIENT)
Dept: HEPATOLOGY | Facility: CLINIC | Age: 73
End: 2023-12-27
Payer: MEDICARE

## 2023-12-27 VITALS — BODY MASS INDEX: 26.69 KG/M2 | HEIGHT: 74 IN | WEIGHT: 208 LBS

## 2023-12-27 DIAGNOSIS — K74.60 LIVER CIRRHOSIS SECONDARY TO NASH: Primary | ICD-10-CM

## 2023-12-27 DIAGNOSIS — I85.10 SECONDARY ESOPHAGEAL VARICES WITHOUT BLEEDING: ICD-10-CM

## 2023-12-27 DIAGNOSIS — K75.81 LIVER CIRRHOSIS SECONDARY TO NASH: Primary | ICD-10-CM

## 2023-12-27 PROCEDURE — 4010F PR ACE/ARB THEARPY RXD/TAKEN: ICD-10-PCS | Mod: CPTII,95,, | Performed by: NURSE PRACTITIONER

## 2023-12-27 PROCEDURE — 1159F PR MEDICATION LIST DOCUMENTED IN MEDICAL RECORD: ICD-10-PCS | Mod: CPTII,95,, | Performed by: NURSE PRACTITIONER

## 2023-12-27 PROCEDURE — 3008F PR BODY MASS INDEX (BMI) DOCUMENTED: ICD-10-PCS | Mod: CPTII,95,, | Performed by: NURSE PRACTITIONER

## 2023-12-27 PROCEDURE — 1101F PT FALLS ASSESS-DOCD LE1/YR: CPT | Mod: CPTII,95,, | Performed by: NURSE PRACTITIONER

## 2023-12-27 PROCEDURE — 99214 OFFICE O/P EST MOD 30 MIN: CPT | Mod: 95,,, | Performed by: NURSE PRACTITIONER

## 2023-12-27 PROCEDURE — 99214 PR OFFICE/OUTPT VISIT, EST, LEVL IV, 30-39 MIN: ICD-10-PCS | Mod: 95,,, | Performed by: NURSE PRACTITIONER

## 2023-12-27 PROCEDURE — 1126F AMNT PAIN NOTED NONE PRSNT: CPT | Mod: CPTII,95,, | Performed by: NURSE PRACTITIONER

## 2023-12-27 PROCEDURE — 1101F PR PT FALLS ASSESS DOC 0-1 FALLS W/OUT INJ PAST YR: ICD-10-PCS | Mod: CPTII,95,, | Performed by: NURSE PRACTITIONER

## 2023-12-27 PROCEDURE — 3288F FALL RISK ASSESSMENT DOCD: CPT | Mod: CPTII,95,, | Performed by: NURSE PRACTITIONER

## 2023-12-27 PROCEDURE — 3008F BODY MASS INDEX DOCD: CPT | Mod: CPTII,95,, | Performed by: NURSE PRACTITIONER

## 2023-12-27 PROCEDURE — 4010F ACE/ARB THERAPY RXD/TAKEN: CPT | Mod: CPTII,95,, | Performed by: NURSE PRACTITIONER

## 2023-12-27 PROCEDURE — 1126F PR PAIN SEVERITY QUANTIFIED, NO PAIN PRESENT: ICD-10-PCS | Mod: CPTII,95,, | Performed by: NURSE PRACTITIONER

## 2023-12-27 PROCEDURE — 1159F MED LIST DOCD IN RCRD: CPT | Mod: CPTII,95,, | Performed by: NURSE PRACTITIONER

## 2023-12-27 PROCEDURE — 3288F PR FALLS RISK ASSESSMENT DOCUMENTED: ICD-10-PCS | Mod: CPTII,95,, | Performed by: NURSE PRACTITIONER

## 2023-12-27 NOTE — PROGRESS NOTES
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 A diagnosis of Secondary esophageal varices without bleeding was also pertinent to this visit.    PCP: Meeks, Claude H.     The patient location is: [place]  The chief complaint leading to consultation is: above    Visit type: audiovisual    Face to Face time with patient: 15 minutes  20 minutes of total time spent on the encounter, which includes face to face time and non-face to face time preparing to see the patient (eg, review of tests), Obtaining and/or reviewing separately obtained history, Documenting clinical information in the electronic or other health record, Independently interpreting results (not separately reported) and communicating results to the patient/family/caregiver, or Care coordination (not separately reported).         Each patient to whom he or she provides medical services by telemedicine is:  (1) informed of the relationship between the physician and patient and the respective role of any other health care provider with respect to management of the patient; and (2) notified that he or she may decline to receive medical services by telemedicine and may withdraw from such care at any time.    Notes:       HPI:  This is a 73 y.o. male here for follow up of the above  Pt states that he has been feeling overall well without decompensating events  US without lesion or mass  Labs were completed on 12/21 but remain pending for unclear reason.   Denies any abdominal pain.  No nausea or vomiting.  No change in bowel pattern.  No melena or hematochezia. No weight loss.  No upper GI bleeding.  No ascites or BLE.  No overt confusion.      Review of Systems   Constitutional:  Negative for chills, fever, malaise/fatigue and weight loss.   Respiratory:  Negative for cough.    Cardiovascular:  Negative for chest pain.   Gastrointestinal:          Per HPI   Musculoskeletal:  Negative for myalgias.   Skin:  Negative for itching and rash.   Neurological:  Negative for headaches.   Psychiatric/Behavioral:  The patient is not nervous/anxious.        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    Social History:   Social History     Tobacco Use    Smoking status: Never    Smokeless tobacco: Never   Substance Use Topics    Alcohol use: Yes     Comment: socially    Drug use: No       Allergies: Reviewed    Home Medications:  Current Outpatient Medications on File Prior to Visit   Medication Sig Dispense Refill    amLODIPine (NORVASC) 5 MG tablet       blood-glucose meter Misc AS DIRECTED  0    fish oil-omega-3 fatty acids 300-1,000 mg capsule Take 2 g by mouth once daily.      glimepiride (AMARYL) 1 MG tablet       hydroCHLOROthiazide (HYDRODIURIL) 12.5 MG Tab       latanoprost 0.005 % ophthalmic solution Place 1 drop into the right eye every evening.      loratadine (CLARITIN) 10 mg tablet Take 10 mg by mouth once daily.      losartan-hydrochlorothiazide 100-12.5 mg (HYZAAR) 100-12.5 mg Tab Take 1 tablet by mouth once daily.       meloxicam (MOBIC) 15 MG tablet       metFORMIN (GLUCOPHAGE) 500 MG tablet Take 500 mg by mouth 2 (two) times daily.       MILK THISTLE ORAL Take by mouth.      multivitamin (THERAGRAN) per tablet Take 1 tablet by mouth once daily.      olmesartan (BENICAR) 40 MG tablet       omeprazole (PRILOSEC) 10 MG capsule Take 10 mg by mouth once daily.      oxyCODONE-acetaminophen (PERCOCET)  mg per tablet     "   propranolol (INDERAL) 10 MG tablet TAKE ONE TABLET BY MOUTH TWICE A  tablet 1    psyllium husk (METAMUCIL ORAL) Take by mouth.      TRUE METRIX GLUCOSE TEST STRIP Strp USE TO Check blood glucose TWICE DAILY      TRUEPLUS LANCETS 28 gauge Misc USE TO Check blood glucose TWICE DAILY       No current facility-administered medications on file prior to visit.       Physical Exam:  Vital Signs:  Ht 6' 2" (1.88 m)   Wt 94.3 kg (208 lb)   BMI 26.71 kg/m²   Body mass index is 26.71 kg/m².  Physical Exam  Constitutional:       Appearance: He is well-developed.   HENT:      Head: Normocephalic.   Eyes:      General: No scleral icterus.  Pulmonary:      Effort: Pulmonary effort is normal.   Musculoskeletal:         General: Normal range of motion.      Cervical back: Normal range of motion.   Skin:     General: Skin is dry.   Neurological:      Mental Status: He is alert.         Labs: Pertinent labs reviewed.  MELD 3.0: 11 at 6/22/2023 10:20 AM  MELD-Na: 11 at 6/22/2023 10:20 AM  Calculated from:  Serum Creatinine: 0.8 mg/dL (Using min of 1 mg/dL) at 6/22/2023 10:20 AM  Serum Sodium: 139 mmol/L (Using max of 137 mmol/L) at 6/22/2023 10:20 AM  Total Bilirubin: 1.9 mg/dL at 6/22/2023 10:20 AM  Serum Albumin: 4.2 g/dL (Using max of 3.5 g/dL) at 6/22/2023 10:20 AM  INR(ratio): 1.2 at 6/22/2023 10:20 AM  Age at listing (hypothetical): 72 years  Sex: Male at 6/22/2023 10:20 AM  EV: on BB  HCC: US 12/23 without lesion or mass         Assessment:  1. 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 Secondary esophageal varices without bleeding        Recommendations:  Stable without decompensating events  Will contact the lab in [place] for clarification on the pending status.  May need repeat labs depending.   For now, will plan for continued MELD labs and HCC screening in 6 months  Continue BB for EV prophylaxis.  If stopped, will need EGD       Return to Clinic:  6 months with pre-clinic labs and imaging.           "

## 2024-02-14 ENCOUNTER — TELEPHONE (OUTPATIENT)
Dept: HEPATOLOGY | Facility: CLINIC | Age: 74
End: 2024-02-14
Payer: MEDICARE

## 2024-02-14 NOTE — TELEPHONE ENCOUNTER
Spoke with patient in regard to scheduling appointments. Patient appointments scheduled. Patent voices understanding.       ----- Message from Danya Richard sent at 2/14/2024  2:25 PM CST -----  Type:  Patient Returning Call    Who Called:pt   Who Left Message for Patient:  Does the patient know what this is regarding?: US   Would the patient rather a call back or a response via [facility]? call  Best Call Back Number:[phone]  Additional Information: states an appt was scheduled for US and wanted to know was something wrong would like to speak with office

## 2024-06-20 ENCOUNTER — HOSPITAL ENCOUNTER (OUTPATIENT)
Dept: RADIOLOGY | Facility: HOSPITAL | Age: 74
Discharge: HOME | End: 2024-06-20
Attending: NURSE PRACTITIONER
Payer: MEDICARE

## 2024-06-20 DIAGNOSIS — K75.81 LIVER CIRRHOSIS SECONDARY TO NASH: ICD-10-CM

## 2024-06-20 DIAGNOSIS — K74.60 LIVER CIRRHOSIS SECONDARY TO NASH: ICD-10-CM

## 2024-06-20 DIAGNOSIS — I85.10 SECONDARY ESOPHAGEAL VARICES WITHOUT BLEEDING: ICD-10-CM

## 2024-06-20 PROCEDURE — 76705 ECHO EXAM OF ABDOMEN: CPT | Mod: TC

## 2024-06-21 ENCOUNTER — TELEPHONE (OUTPATIENT)
Dept: HEPATOLOGY | Facility: CLINIC | Age: 74
End: 2024-06-21
Payer: MEDICARE

## 2024-07-24 ENCOUNTER — PATIENT MESSAGE (OUTPATIENT)
Dept: HEPATOLOGY | Facility: CLINIC | Age: 74
End: 2024-07-24
Payer: MEDICARE

## 2024-11-18 ENCOUNTER — PATIENT MESSAGE (OUTPATIENT)
Dept: HEPATOLOGY | Facility: CLINIC | Age: 74
End: 2024-11-18
Payer: MEDICARE

## 2025-01-23 ENCOUNTER — OFFICE VISIT (OUTPATIENT)
Dept: HEPATOLOGY | Facility: CLINIC | Age: 75
End: 2025-01-23
Payer: MEDICARE

## 2025-01-23 DIAGNOSIS — K75.81 LIVER CIRRHOSIS SECONDARY TO NASH: Primary | ICD-10-CM

## 2025-01-23 DIAGNOSIS — I85.00 ESOPHAGEAL VARICES WITHOUT BLEEDING, UNSPECIFIED ESOPHAGEAL VARICES TYPE: ICD-10-CM

## 2025-01-23 DIAGNOSIS — K74.60 LIVER CIRRHOSIS SECONDARY TO NASH: Primary | ICD-10-CM

## 2025-01-23 PROCEDURE — 99499 UNLISTED E&M SERVICE: CPT | Mod: 95,,, | Performed by: INTERNAL MEDICINE

## 2025-01-23 RX ORDER — MONTELUKAST SODIUM 10 MG/1
10 TABLET ORAL NIGHTLY
COMMUNITY

## 2025-01-23 NOTE — Clinical Note
Please get labs and abdo US done at outside facility for my review-scan into media and then reschedule appt

## 2025-01-23 NOTE — PROGRESS NOTES
No labs or abdo US to review. Need to reschedule appt after obtain records. Pt instructed to see pcp to have EGD done

## 2025-01-31 ENCOUNTER — TELEPHONE (OUTPATIENT)
Dept: HEPATOLOGY | Facility: CLINIC | Age: 75
End: 2025-01-31
Payer: MEDICARE

## 2025-01-31 NOTE — TELEPHONE ENCOUNTER
An appointment has been scheduled on Thursday, February 6, 2025 at 11:00AM (Virtual).  Patient confirmed

## 2025-01-31 NOTE — TELEPHONE ENCOUNTER
Medical release request form sent to [facility] for labs and US (Jan 2024/Dec 54391.  Patient does not remember when procedure was completed.      Main  [phone]  Fax     Patient would like to wait until labs and US ABD results are in before scheduling follow-up appt.

## 2025-02-06 ENCOUNTER — OFFICE VISIT (OUTPATIENT)
Dept: HEPATOLOGY | Facility: CLINIC | Age: 75
End: 2025-02-06
Payer: MEDICARE

## 2025-02-06 ENCOUNTER — TELEPHONE (OUTPATIENT)
Dept: HEPATOLOGY | Facility: CLINIC | Age: 75
End: 2025-02-06

## 2025-02-06 DIAGNOSIS — K74.60 LIVER CIRRHOSIS SECONDARY TO NASH: Primary | ICD-10-CM

## 2025-02-06 DIAGNOSIS — K75.81 LIVER CIRRHOSIS SECONDARY TO NASH: Primary | ICD-10-CM

## 2025-02-06 DIAGNOSIS — I85.00 ESOPHAGEAL VARICES WITHOUT BLEEDING, UNSPECIFIED ESOPHAGEAL VARICES TYPE: ICD-10-CM

## 2025-02-06 PROCEDURE — 98006 SYNCH AUDIO-VIDEO EST MOD 30: CPT | Mod: 95,,, | Performed by: INTERNAL MEDICINE

## 2025-02-06 PROCEDURE — 1160F RVW MEDS BY RX/DR IN RCRD: CPT | Mod: CPTII,95,, | Performed by: INTERNAL MEDICINE

## 2025-02-06 PROCEDURE — 1159F MED LIST DOCD IN RCRD: CPT | Mod: CPTII,95,, | Performed by: INTERNAL MEDICINE

## 2025-02-06 PROCEDURE — 4010F ACE/ARB THERAPY RXD/TAKEN: CPT | Mod: CPTII,95,, | Performed by: INTERNAL MEDICINE

## 2025-02-06 NOTE — TELEPHONE ENCOUNTER
Patient was contacted to schedule US, labs and F/U.  No answer, an voicemail was left with the call back .        ----- Message from Candelaria Bajwa MD sent at 2/6/2025  3:51 PM CST -----  1. Labs and abdo US now and in 6 months  2. Return in 6 months

## 2025-02-06 NOTE — PROGRESS NOTES
The patient location is: home  The chief complaint leading to consultation is: f/u cirrhosis    Visit type: audiovisual    Face to Face time with patient: 15 minutes  30 minutes of total time spent on the encounter, which includes face to face time and non-face to face time preparing to see the patient (eg, review of tests), Obtaining and/or reviewing separately obtained history, Documenting clinical information in the electronic or other health record, Independently interpreting results (not separately reported) and communicating results to the patient/family/caregiver, or Care coordination (not separately reported).     Each patient to whom he or she provides medical services by telemedicine is:  (1) informed of the relationship between the physician and patient and the respective role of any other health care provider with respect to management of the patient; and (2) notified that he or she may decline to receive medical services by telemedicine and may withdraw from such care at any time.    Notes: HEPATOLOGY FOLLOW UP    Referring Physician: Meeks, Claude H., MD   Current Corresponding Physician: Meeks, Claude H., MD Horace C Richard Jr. is here for follow up of Cirrhosis and Fatty Liver    HPI  --seen by LEONA in ; last visit 12/23:  --74 y.o. male here for follow up of the MASLD complicated by cirrhosis and portal htn    Interval History  Since Phi Segovia Jr.'s last visit:  --feeling overall well without decompensating events  --denies any abdominal pain/nausea/vomiting  --no change in bowel pattern  --no melena/hematochezia  --no weight loss.  --no edema and no ascites  --No symptoms of HE  ----no upper GI bleeding; EGD 2019: large varices-started beta blocker; HR 57    Labs 6/20/24: ALT 40, AST 44, ALKP 114, Tbil 1.3, albumin 4.2, AFP 3.9  Abdo US limited 6/20/24: no lvier cancer    MELD 3.0: 8 at 6/20/2024  9:36 AM  MELD-Na: 8 at 6/20/2024  9:36 AM  Calculated from:  Serum Creatinine: 0.81 mg/dL  (Using min of 1 mg/dL) at 6/20/2024  9:36 AM  Serum Sodium: 145 mmol/L (Using max of 137 mmol/L) at 6/20/2024  9:36 AM  Total Bilirubin: 1.3 mg/dL at 6/20/2024  9:36 AM  Serum Albumin: 4.2 g/dL (Using max of 3.5 g/dL) at 6/20/2024  9:36 AM  INR(ratio): 1.1 at 6/20/2024  9:36 AM  Age at listing (hypothetical): 73 years  Sex: Male at 6/20/2024  9:36 AM       Outpatient Encounter Medications as of 2/6/2025   Medication Sig Dispense Refill    amLODIPine (NORVASC) 5 MG tablet       blood-glucose meter Misc AS DIRECTED  0    fish oil-omega-3 fatty acids 300-1,000 mg capsule Take 2 g by mouth once daily.      glimepiride (AMARYL) 1 MG tablet       hydroCHLOROthiazide (HYDRODIURIL) 12.5 MG Tab       latanoprost 0.005 % ophthalmic solution Place 1 drop into the right eye every evening.      loratadine (CLARITIN) 10 mg tablet Take 10 mg by mouth once daily.      losartan-hydrochlorothiazide 100-12.5 mg (HYZAAR) 100-12.5 mg Tab Take 1 tablet by mouth once daily.       meloxicam (MOBIC) 15 MG tablet       metFORMIN (GLUCOPHAGE) 500 MG tablet Take 500 mg by mouth 2 (two) times daily.       MILK THISTLE ORAL Take by mouth.      montelukast (SINGULAIR) 10 mg tablet Take 10 mg by mouth every evening.      multivitamin (THERAGRAN) per tablet Take 1 tablet by mouth once daily.      olmesartan (BENICAR) 40 MG tablet       omeprazole (PRILOSEC) 10 MG capsule Take 10 mg by mouth once daily.      oxyCODONE-acetaminophen (PERCOCET)  mg per tablet       propranolol (INDERAL) 10 MG tablet TAKE ONE TABLET BY MOUTH TWICE A  tablet 1    psyllium husk (METAMUCIL ORAL) Take by mouth.      TRUE METRIX GLUCOSE TEST STRIP Strp USE TO Check blood glucose TWICE DAILY      TRUEPLUS LANCETS 28 gauge Misc USE TO Check blood glucose TWICE DAILY       No facility-administered encounter medications on file as of 2/6/2025.     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    HENT: Negative.     Eyes: Negative.    Respiratory: Negative.     Cardiovascular: Negative.    Gastrointestinal: Negative.    Genitourinary: Negative.    Musculoskeletal: Negative.    Skin: Negative.    Neurological: Negative.    Psychiatric/Behavioral: Negative.       There were no vitals filed for this visit.    Physical Exam    MELD 3.0: 8 at 6/20/2024  9:36 AM  MELD-Na: 8 at 6/20/2024  9:36 AM  Calculated from:  Serum Creatinine: 0.81 mg/dL (Using min of 1 mg/dL) at 6/20/2024  9:36 AM  Serum Sodium: 145 mmol/L (Using max of 137 mmol/L) at 6/20/2024  9:36 AM  Total Bilirubin: 1.3 mg/dL at 6/20/2024  9:36 AM  Serum Albumin: 4.2 g/dL (Using max of 3.5 g/dL) at 6/20/2024  9:36 AM  INR(ratio): 1.1 at 6/20/2024  9:36 AM  Age at listing (hypothetical): 73 years  Sex: Male at 6/20/2024  9:36 AM      Lab Results   Component Value Date     06/22/2023    BUN 17.0 06/20/2024    BUN 15 06/22/2023    CREATININE 0.81 06/20/2024    CREATININE 0.8 06/22/2023    CALCIUM 10.1 (H) 06/20/2024    CALCIUM 9.7 06/22/2023     06/20/2024     06/22/2023    K 4.3 06/20/2024    K 3.8 06/22/2023     (H) 06/20/2024     06/22/2023    PROT 7.7 06/22/2023    CO2 28 06/20/2024    CO2 22 (L) 06/22/2023    ANIONGAP 11 06/22/2023    WBC 5.64 06/20/2024    WBC 7.40 06/22/2023    RBC 4.55 (L) 06/20/2024    RBC 4.74 06/22/2023    HGB 14.9 06/20/2024    HGB 15.9 06/22/2023    HCT 41.2 (L) 06/20/2024    HCT 42.7 06/22/2023    MCV 90.5 06/20/2024    MCV 90 06/22/2023    MCH 32.7 (H) 06/20/2024    MCH 33.5 (H) 06/22/2023    MCHC 36.2 (H) 06/20/2024    MCHC 37.2 (H) 06/22/2023     Lab Results   Component Value Date    RDW 14.0 06/20/2024    RDW 13.7 06/22/2023     (L) 06/20/2024    PLT 97 (L) 06/22/2023    MPV 11.1 (H) 06/20/2024    MPV 10.8  06/22/2023    GRAN 5.0 06/22/2023    GRAN 67.4 06/22/2023    LYMPH 1.6 06/22/2023    LYMPH 21.2 06/22/2023    MONO 0.6 06/22/2023    MONO 8.0 06/22/2023    EOSINOPHIL 2.8 06/22/2023    BASOPHIL 0.5 06/22/2023    EOS 0.2 06/22/2023    BASO 0.04 06/22/2023    CHOL 170 09/08/2022    CHOL 159 04/13/2018    TRIG 136 09/08/2022    TRIG 108 04/13/2018    HDL 39 09/08/2022    HDL 37 (L) 04/13/2018    .00 09/08/2022    CHOLHDL 23.3 04/13/2018    TOTALCHOLEST 4 09/08/2022    TOTALCHOLEST 4.3 04/13/2018    ALBUMIN 4.2 06/20/2024    ALBUMIN 4.2 06/22/2023    BILIDIR 0.4 09/09/2021    AST 44 (H) 06/20/2024    AST 49 (H) 06/22/2023    ALT 40 06/20/2024    ALT 52 (H) 06/22/2023    ALKPHOS 114 06/20/2024    ALKPHOS 113 06/22/2023    LABPROT 7.6 06/20/2024    LABPROT 11.9 (H) 06/20/2024    LABPROT 12.8 (H) 06/22/2023    INR 1.1 06/20/2024    INR 1.2 06/22/2023    PSA 2.68 09/08/2022    PSA 1.3 04/13/2018       Assessment and Plan:  Phi Segovia Jr. is a 74 y.o. male withCirrhosis and Fatty Liver  Liver appears to remain compensated. Recommend:  Cirrhosis, compensated: meld labs now and every 6 months; abdo US now w AFP and every 6 months to screen for HCC  MASLD: eat foods low in fat; keep BSs under good control; target BMI 20-25; avoid alcohol  Esophageal varices, ognoing: continue propranolol; is at target HR (57).  Return 6 months

## 2025-02-07 ENCOUNTER — TELEPHONE (OUTPATIENT)
Dept: HEPATOLOGY | Facility: CLINIC | Age: 75
End: 2025-02-07
Payer: MEDICARE

## 2025-02-07 NOTE — TELEPHONE ENCOUNTER
Patient was contacted to schedule F/U, US & labs (1wk prior) in 6 months. No answer, an voicemail was left with the call back .  A Recall has been submitted for scheduling in 6 months.          ----- Message from Candelaria Bajwa MD sent at 2/6/2025  3:51 PM CST -----  1. Labs and abdo US now and in 6 months  2. Return in 6 months

## 2025-02-07 NOTE — TELEPHONE ENCOUNTER
An appointment has been scheduled on Thursday, August 7, 2025 at 9:00AM ([facility] - US & labs).  Patient confirmed.  A copy has been mailed out.          ----- Message from Sussy sent at 2/7/2025 12:16 PM CST -----  Contact: [phone]  Returning a phone call.    Who left a message for the patient:  Linda Natarajan MA    Do they know what this is regarding:  yes    Would they like a phone call back or a response via [facility]:  call

## 2025-08-06 ENCOUNTER — TELEPHONE (OUTPATIENT)
Dept: HEPATOLOGY | Facility: CLINIC | Age: 75
End: 2025-08-06
Payer: MEDICARE

## 2025-08-06 NOTE — TELEPHONE ENCOUNTER
Called pt to help him reschedule US and labs to [facility] location on 8/14/25.   Pt agreed to new date and time of appointments.

## 2025-08-06 NOTE — TELEPHONE ENCOUNTER
Copied from CRM #[id number]. Topic: Appointments - Appointment Access  >> Aug 6, 2025  3:29 PM Kimberly wrote:  RESCHEDULE/APPTS    Current Appt Date: 08/07/25    Type of Appt: Est     Physician: Catracho     Reason for Scheduling: Pt would like to reschedule his appts for 8/14 and wants to see if he can have them done at [facility]. Would like a call back to confirm     Caller: Phi    Contact Preference:  [phone] (home)

## 2025-08-14 ENCOUNTER — HOSPITAL ENCOUNTER (OUTPATIENT)
Dept: RADIOLOGY | Facility: HOSPITAL | Age: 75
Discharge: HOME | End: 2025-08-14
Attending: INTERNAL MEDICINE
Payer: MEDICARE

## 2025-08-14 DIAGNOSIS — K74.60 LIVER CIRRHOSIS SECONDARY TO NASH: ICD-10-CM

## 2025-08-14 DIAGNOSIS — K75.81 LIVER CIRRHOSIS SECONDARY TO NASH: ICD-10-CM

## 2025-08-14 PROCEDURE — 76700 US EXAM ABDOM COMPLETE: CPT | Mod: TC
